# Patient Record
Sex: FEMALE | Race: WHITE | NOT HISPANIC OR LATINO | Employment: UNEMPLOYED | URBAN - NONMETROPOLITAN AREA
[De-identification: names, ages, dates, MRNs, and addresses within clinical notes are randomized per-mention and may not be internally consistent; named-entity substitution may affect disease eponyms.]

---

## 2017-04-22 DIAGNOSIS — R00.2 PALPITATIONS: Primary | ICD-10-CM

## 2017-04-22 DIAGNOSIS — I10 ESSENTIAL HYPERTENSION: ICD-10-CM

## 2017-04-24 RX ORDER — ATENOLOL 25 MG/1
TABLET ORAL
Qty: 60 TABLET | Refills: 5 | Status: SHIPPED | OUTPATIENT
Start: 2017-04-24 | End: 2017-11-05 | Stop reason: SDUPTHER

## 2017-11-05 DIAGNOSIS — R00.2 PALPITATIONS: ICD-10-CM

## 2017-11-05 DIAGNOSIS — I10 ESSENTIAL HYPERTENSION: ICD-10-CM

## 2017-11-06 RX ORDER — ATENOLOL 25 MG/1
TABLET ORAL
Qty: 60 TABLET | Refills: 4 | Status: SHIPPED | OUTPATIENT
Start: 2017-11-06 | End: 2018-12-05 | Stop reason: SDUPTHER

## 2018-03-06 ENCOUNTER — OFFICE VISIT (OUTPATIENT)
Dept: CARDIOLOGY | Facility: CLINIC | Age: 33
End: 2018-03-06

## 2018-03-06 VITALS
WEIGHT: 162.8 LBS | BODY MASS INDEX: 26.16 KG/M2 | HEART RATE: 74 BPM | DIASTOLIC BLOOD PRESSURE: 63 MMHG | SYSTOLIC BLOOD PRESSURE: 103 MMHG | OXYGEN SATURATION: 98 % | HEIGHT: 66 IN

## 2018-03-06 DIAGNOSIS — R00.2 PALPITATION: ICD-10-CM

## 2018-03-06 DIAGNOSIS — R55 SYNCOPE, UNSPECIFIED SYNCOPE TYPE: ICD-10-CM

## 2018-03-06 DIAGNOSIS — M79.603 PAIN OF UPPER EXTREMITY, UNSPECIFIED LATERALITY: ICD-10-CM

## 2018-03-06 DIAGNOSIS — R06.02 SHORTNESS OF BREATH: Primary | ICD-10-CM

## 2018-03-06 PROCEDURE — 93000 ELECTROCARDIOGRAM COMPLETE: CPT | Performed by: PHYSICIAN ASSISTANT

## 2018-03-06 PROCEDURE — 99214 OFFICE O/P EST MOD 30 MIN: CPT | Performed by: PHYSICIAN ASSISTANT

## 2018-03-06 RX ORDER — GABAPENTIN 400 MG/1
CAPSULE ORAL 3 TIMES DAILY
COMMUNITY
Start: 2018-03-05

## 2018-03-06 RX ORDER — LANSOPRAZOLE 30 MG/1
CAPSULE, DELAYED RELEASE ORAL DAILY
Refills: 5 | COMMUNITY
Start: 2018-02-27 | End: 2018-12-05

## 2018-03-06 RX ORDER — DICYCLOMINE HYDROCHLORIDE 10 MG/1
CAPSULE ORAL
Refills: 2 | COMMUNITY
Start: 2018-02-08

## 2018-03-06 RX ORDER — ATORVASTATIN CALCIUM 20 MG/1
20 TABLET, FILM COATED ORAL NIGHTLY
COMMUNITY
Start: 2018-03-05 | End: 2021-02-04 | Stop reason: SDUPTHER

## 2018-03-06 NOTE — PROGRESS NOTES
Problem list     Subjective   Rafat Barrera is a 32 y.o. female     Chief Complaint   Patient presents with   • Palpitations     Here for yearly f/u   • Heartburn       HPI      1. Chest pain  1.1 Stress test negative 2015 but EKG changes worrisome but no diagnostic for ischemia  1.2 No CAD noted by recent cardiac CT, April 2015.  2. Palpitations  3. Dyspnea  4. Tobacco Habituation, smokes 1/2 a pack a day  5. GERD  6.  Nonobstructive lower extremity arterial 2017    Patient is a 32-year-old female that presents back for follow-up.  She describes having intermittent episodes of arm discomfort in the left arm.  She describes it being exacerbated whenever she gets her blood pressure taken.  She has no chest pain or pressure.  She has very minor shortness of breath when exerting but nothing progressive.  No PND orthopnea.  She doesn't palpitate but on occasion with atenolol.  She does describe orthostatic lightheadedness.  She also has history of syncope.  She describes at random times she will be walking and feel a sudden loss of consciousness without any preceding cardiac or neurologic symptoms.  She has a chronic history of syncope per report but otherwise is doing well      Outpatient Encounter Prescriptions as of 3/6/2018   Medication Sig Dispense Refill   • aspirin 81 MG tablet Take 1 tablet by mouth daily.     • atenolol (TENORMIN) 25 MG tablet TAKE ONE TABLET BY MOUTH TWICE A DAY 60 tablet 4   • atorvastatin (LIPITOR) 10 MG tablet Every Night.     • dicyclomine (BENTYL) 10 MG capsule prn  2   • gabapentin (NEURONTIN) 400 MG capsule 3 (Three) Times a Day.     • lansoprazole (PREVACID) 30 MG capsule Daily.  5   • Loratadine (CLARITIN) 10 MG capsule Take  by mouth daily.     • nitroglycerin (NITROSTAT) 0.4 MG SL tablet Place  under the tongue.     • [DISCONTINUED] atorvastatin (LIPITOR) 40 MG tablet Take 1 tablet by mouth every night.     • [DISCONTINUED] omeprazole (PriLOSEC) 20 MG capsule Take 1 capsule by  mouth daily.       No facility-administered encounter medications on file as of 3/6/2018.        Latex    Past Medical History:   Diagnosis Date   • Abdominal pain    • Chest pain    • Fatigue    • Gastroenteritis    • GERD (gastroesophageal reflux disease)    • Hyperlipidemia    • Hypertension    • Palpitations    • Shortness of breath        Social History     Social History   • Marital status: Single     Spouse name: N/A   • Number of children: N/A   • Years of education: N/A     Occupational History   • Not on file.     Social History Main Topics   • Smoking status: Current Every Day Smoker     Packs/day: 1.00     Types: Cigarettes   • Smokeless tobacco: Never Used      Comment: 1/2 ppd   • Alcohol use No   • Drug use: No      Comment: History of drug abuse   • Sexual activity: Not on file     Other Topics Concern   • Not on file     Social History Narrative       Family History   Problem Relation Age of Onset   • Diabetes Mother    • Hyperlipidemia Mother    • Heart attack Father    • Hypertension Father    • Heart attack Maternal Aunt    • Heart attack Maternal Uncle    • Heart failure Other      congestive   • Stroke Other    • Sudden death Other        Review of Systems   Constitutional: Positive for fatigue (off and on).   HENT: Negative.    Eyes: Negative.    Respiratory: Positive for shortness of breath.    Cardiovascular: Positive for chest pain (occas.), palpitations and leg swelling.   Gastrointestinal: Positive for diarrhea.   Endocrine: Negative.    Genitourinary: Negative.    Musculoskeletal: Negative.    Skin: Negative.    Allergic/Immunologic: Positive for environmental allergies.   Neurological: Positive for dizziness and light-headedness.   Hematological: Bruises/bleeds easily (bleed).   Psychiatric/Behavioral: Positive for sleep disturbance (off and on).       Objective   Vitals:    03/06/18 1128   BP: 103/63   BP Location: Left arm   Patient Position: Sitting   Pulse: 74   SpO2: 98%   Weight:  "73.8 kg (162 lb 12.8 oz)   Height: 167.6 cm (65.98\")      /63 (BP Location: Left arm, Patient Position: Sitting)  Pulse 74  Ht 167.6 cm (65.98\")  Wt 73.8 kg (162 lb 12.8 oz)  SpO2 98%  BMI 26.29 kg/m2    Lab Results (most recent)     None          Physical Exam   Constitutional: She is oriented to person, place, and time. She appears well-developed and well-nourished. No distress.   HENT:   Head: Normocephalic and atraumatic.   Eyes: EOM are normal. Pupils are equal, round, and reactive to light.   Neck: No JVD present.   Cardiovascular: Normal rate, regular rhythm, normal heart sounds and intact distal pulses.  Exam reveals no gallop and no friction rub.    No murmur heard.  Pulmonary/Chest: Effort normal and breath sounds normal. No respiratory distress. She has no wheezes. She has no rales. She exhibits no tenderness.   Musculoskeletal: Normal range of motion. She exhibits no edema.   Neurological: She is alert and oriented to person, place, and time. No cranial nerve deficit.   Skin: Skin is warm and dry. No rash noted. No erythema. No pallor.   Psychiatric: She has a normal mood and affect. Her behavior is normal.   Nursing note and vitals reviewed.      Procedure     ECG 12 Lead  Date/Time: 3/6/2018 11:32 AM  Performed by: AUGUSTINA GOODWIN  Authorized by: AUGUSTINA GOODWIN   Rhythm: sinus tachycardia  Comments: EKG demonstrates sinus rhythm at 67 bpm otherwise normal               Assessment/Plan     Problems Addressed this Visit        Cardiovascular and Mediastinum    Palpitation    Relevant Orders    ECG 12 Lead    Ambulatory Referral to Neurology    Cardiac Event Monitor    Tilt Table    Syncope    Relevant Orders    Cardiac Event Monitor    Tilt Table       Respiratory    Shortness of breath - Primary    Relevant Orders    ECG 12 Lead    Ambulatory Referral to Neurology    Cardiac Event Monitor    Tilt Table       Nervous and Auditory    Pain of upper extremity    Relevant Orders    Ambulatory " Referral to Neurology    Cardiac Event Monitor    Tilt Table                     Recommendation  1.  I would like patient referred to neurology based on her upper extremity pain.  On physical examination she had reproduction of pain whenever I assessed her radial artery.  I concerned about carpal tunnel syndrome and we'll refer her to neurology for nerve conduction study.  2.  I would like to obtain an extended event monitor look for any arrhythmias based on patient's syncope.  She has history of normal coronaries by cardiac CTA  3.  I would like to obtain a tilt table test ordered and look for any evidence of neurocardiogenic syncope which is high and differential diagnosis.  4.  Otherwise we'll see her back for follow-up of above testing.  She is not to do any activity in which a sudden loss of consciousness could cause bodily harm such as driving.  She'll follow-up primary as scheduled     Electronically signed by:

## 2018-03-19 ENCOUNTER — HOSPITAL ENCOUNTER (OUTPATIENT)
Dept: CARDIOLOGY | Facility: HOSPITAL | Age: 33
Discharge: HOME | End: 2018-03-19
Admitting: PHYSICIAN ASSISTANT

## 2018-03-19 LAB — BH CV TILT AGENT USED: NORMAL

## 2018-03-19 PROCEDURE — 93660 TILT TABLE EVALUATION: CPT

## 2018-11-07 DIAGNOSIS — R00.2 PALPITATIONS: ICD-10-CM

## 2018-11-07 DIAGNOSIS — I10 ESSENTIAL HYPERTENSION: ICD-10-CM

## 2018-11-07 RX ORDER — ATENOLOL 25 MG/1
TABLET ORAL
Qty: 60 TABLET | Refills: 3 | OUTPATIENT
Start: 2018-11-07

## 2018-12-05 ENCOUNTER — OFFICE VISIT (OUTPATIENT)
Dept: CARDIOLOGY | Facility: CLINIC | Age: 33
End: 2018-12-05

## 2018-12-05 VITALS
SYSTOLIC BLOOD PRESSURE: 109 MMHG | WEIGHT: 177.6 LBS | DIASTOLIC BLOOD PRESSURE: 61 MMHG | OXYGEN SATURATION: 98 % | BODY MASS INDEX: 28.54 KG/M2 | HEART RATE: 80 BPM | HEIGHT: 66 IN

## 2018-12-05 DIAGNOSIS — I10 ESSENTIAL HYPERTENSION: ICD-10-CM

## 2018-12-05 DIAGNOSIS — R06.02 SHORTNESS OF BREATH: ICD-10-CM

## 2018-12-05 DIAGNOSIS — R00.2 PALPITATIONS: ICD-10-CM

## 2018-12-05 DIAGNOSIS — R07.9 CHEST PAIN, UNSPECIFIED TYPE: Primary | ICD-10-CM

## 2018-12-05 PROCEDURE — 99213 OFFICE O/P EST LOW 20 MIN: CPT | Performed by: PHYSICIAN ASSISTANT

## 2018-12-05 RX ORDER — ATENOLOL 25 MG/1
25 TABLET ORAL 2 TIMES DAILY
Qty: 60 TABLET | Refills: 6 | Status: SHIPPED | OUTPATIENT
Start: 2018-12-05 | End: 2020-03-02 | Stop reason: SDUPTHER

## 2018-12-05 RX ORDER — OMEPRAZOLE 20 MG/1
CAPSULE, DELAYED RELEASE ORAL
Refills: 3 | COMMUNITY
Start: 2018-12-03 | End: 2021-02-04 | Stop reason: SDUPTHER

## 2018-12-05 NOTE — PATIENT INSTRUCTIONS
Obesity, Adult  Obesity is the condition of having too much total body fat. Being overweight or obese means that your weight is greater than what is considered healthy for your body size. Obesity is determined by a measurement called BMI. BMI is an estimate of body fat and is calculated from height and weight. For adults, a BMI of 30 or higher is considered obese.  Obesity can eventually lead to other health concerns and major illnesses, including:  · Stroke.  · Coronary artery disease (CAD).  · Type 2 diabetes.  · Some types of cancer, including cancers of the colon, breast, uterus, and gallbladder.  · Osteoarthritis.  · High blood pressure (hypertension).  · High cholesterol.  · Sleep apnea.  · Gallbladder stones.  · Infertility problems.    What are the causes?  The main cause of obesity is taking in (consuming) more calories than your body uses for energy. Other factors that contribute to this condition may include:  · Being born with genes that make you more likely to become obese.  · Having a medical condition that causes obesity. These conditions include:  ? Hypothyroidism.  ? Polycystic ovarian syndrome (PCOS).  ? Binge-eating disorder.  ? Cushing syndrome.  · Taking certain medicines, such as steroids, antidepressants, and seizure medicines.  · Not being physically active (sedentary lifestyle).  · Living where there are limited places to exercise safely or buy healthy foods.  · Not getting enough sleep.    What increases the risk?  The following factors may increase your risk of this condition:  · Having a family history of obesity.  · Being a woman of -American descent.  · Being a man of  descent.    What are the signs or symptoms?  Having excessive body fat is the main symptom of this condition.  How is this diagnosed?  This condition may be diagnosed based on:  · Your symptoms.  · Your medical history.  · A physical exam. Your health care provider may measure:  ? Your BMI. If you are an  adult with a BMI between 25 and less than 30, you are considered overweight. If you are an adult with a BMI of 30 or higher, you are considered obese.  ? The distances around your hips and your waist (circumferences). These may be compared to each other to help diagnose your condition.  ? Your skinfold thickness. Your health care provider may gently pinch a fold of your skin and measure it.    How is this treated?  Treatment for this condition often includes changing your lifestyle. Treatment may include some or all of the following:  · Dietary changes. Work with your health care provider and a dietitian to set a weight-loss goal that is healthy and reasonable for you. Dietary changes may include eating:  ? Smaller portions. A portion size is the amount of a particular food that is healthy for you to eat at one time. This varies from person to person.  ? Low-calorie or low-fat options.  ? More whole grains, fruits, and vegetables.  · Regular physical activity. This may include aerobic activity (cardio) and strength training.  · Medicine to help you lose weight. Your health care provider may prescribe medicine if you are unable to lose 1 pound a week after 6 weeks of eating more healthily and doing more physical activity.  · Surgery. Surgical options may include gastric banding and gastric bypass. Surgery may be done if:  ? Other treatments have not helped to improve your condition.  ? You have a BMI of 40 or higher.  ? You have life-threatening health problems related to obesity.    Follow these instructions at home:    Eating and drinking    · Follow recommendations from your health care provider about what you eat and drink. Your health care provider may advise you to:  ? Limit fast foods, sweets, and processed snack foods.  ? Choose low-fat options, such as low-fat milk instead of whole milk.  ? Eat 5 or more servings of fruits or vegetables every day.  ? Eat at home more often. This gives you more control over  what you eat.  ? Choose healthy foods when you eat out.  ? Learn what a healthy portion size is.  ? Keep low-fat snacks on hand.  ? Avoid sugary drinks, such as soda, fruit juice, iced tea sweetened with sugar, and flavored milk.  ? Eat a healthy breakfast.  · Drink enough water to keep your urine clear or pale yellow.  · Do not go without eating for long periods of time (do not fast) or follow a fad diet. Fasting and fad diets can be unhealthy and even dangerous.  Physical Activity  · Exercise regularly, as told by your health care provider. Ask your health care provider what types of exercise are safe for you and how often you should exercise.  · Warm up and stretch before being active.  · Cool down and stretch after being active.  · Rest between periods of activity.  Lifestyle  · Limit the time that you spend in front of your TV, computer, or video game system.  · Find ways to reward yourself that do not involve food.  · Limit alcohol intake to no more than 1 drink a day for nonpregnant women and 2 drinks a day for men. One drink equals 12 oz of beer, 5 oz of wine, or 1½ oz of hard liquor.  General instructions  · Keep a weight loss journal to keep track of the food you eat and how much you exercise you get.  · Take over-the-counter and prescription medicines only as told by your health care provider.  · Take vitamins and supplements only as told by your health care provider.  · Consider joining a support group. Your health care provider may be able to recommend a support group.  · Keep all follow-up visits as told by your health care provider. This is important.  Contact a health care provider if:  · You are unable to meet your weight loss goal after 6 weeks of dietary and lifestyle changes.  This information is not intended to replace advice given to you by your health care provider. Make sure you discuss any questions you have with your health care provider.  Document Released: 01/25/2006 Document Revised:  05/22/2017 Document Reviewed: 10/05/2016  [employer] Interactive Patient Education © 2018 [employer]  MyPlate from [employer]  The general, healthful diet is based on the 2010 Dietary Guidelines for Americans. The amount of food you need to eat from each food group depends on your age, sex, and level of physical activity and can be individualized by a dietitian. Go to ChooseMyPlate.gov for more information.  What do I need to know about the MyPlate plan?  · Enjoy your food, but eat less.  · Avoid oversized portions.  ? ½ of your plate should include fruits and vegetables.  ? ¼ of your plate should be grains.  ? ¼ of your plate should be protein.  Grains  · Make at least half of your grains whole grains.  · For a 2,000 calorie daily food plan, eat 6 oz every day.  · 1 oz is about 1 slice bread, 1 cup cereal, or ½ cup cooked rice, cereal, or pasta.  Vegetables  · Make half your plate fruits and vegetables.  · For a 2,000 calorie daily food plan, eat 2½ cups every day.  · 1 cup is about 1 cup raw or cooked vegetables or vegetable juice or 2 cups raw leafy greens.  Fruits  · Make half your plate fruits and vegetables.  · For a 2,000 calorie daily food plan, eat 2 cups every day.  · 1 cup is about 1 cup fruit or 100% fruit juice or ½ cup dried fruit.  Protein  · For a 2,000 calorie daily food plan, eat 5½ oz every day.  · 1 oz is about 1 oz meat, poultry, or fish, ¼ cup cooked beans, 1 egg, 1 Tbsp peanut butter, or ½ oz nuts or seeds.  Dairy  · Switch to fat-free or low-fat (1%) milk.  · For a 2,000 calorie daily food plan, eat 3 cups every day.  · 1 cup is about 1 cup milk or yogurt or soy milk (soy beverage), 1½ oz natural cheese, or 2 oz processed cheese.  Fats, Oils, and Empty Calories  · Only small amounts of oils are recommended.  · Empty calories are calories from solid fats or added sugars.  · Compare sodium in foods like soup, bread, and frozen meals. Choose the foods with lower numbers.  · Drink water instead of  sugary drinks.  What foods can I eat?  Grains  Whole grains such as whole wheat, quinoa, millet, and bulgur. Bread, rolls, and pasta made from whole grains. Brown or wild rice. Hot or cold cereals made from whole grains and without added sugar.  Vegetables  All fresh vegetables, especially fresh red, dark green, or orange vegetables. Peas and beans. Low-sodium frozen or canned vegetables prepared without added salt. Low-sodium vegetable juices.  Fruits  All fresh, frozen, and dried fruits. Canned fruit packed in water or fruit juice without added sugar. Fruit juices without added sugar.  Meats and Other Protein Sources  Boiled, baked, or grilled lean meat trimmed of fat. Skinless poultry. Fresh seafood and shellfish. Canned seafood packed in water. Unsalted nuts and unsalted nut butters. Tofu. Dried beans and pea. Eggs.  Dairy  Low-fat or fat-free milk, yogurt, and cheeses.  Sweets and Desserts  Frozen desserts made from low-fat milk.  Fats and Oils  Olive, peanut, and canola oils and margarine. Salad dressing and mayonnaise made from these oils.  Other  Soups and casseroles made from allowed ingredients and without added fat or salt.  The items listed above may not be a complete list of recommended foods or beverages. Contact your dietitian for more options.  What foods are not recommended?  Grains  Sweetened, low-fiber cereals. Packaged baked goods. Snack crackers and chips. Cheese crackers, butter crackers, and biscuits. Frozen waffles, sweet breads, doughnuts, pastries, packaged baking mixes, pancakes, cakes, and cookies.  Vegetables  Regular canned or frozen vegetables or vegetables prepared with salt. Canned tomatoes. Canned tomato sauce. Fried vegetables. Vegetables in cream sauce or cheese sauce.  Fruits  Fruits packed in syrup or made with added sugar.  Meats and Other Protein Sources  Marbled or fatty meats such as ribs. Poultry with skin. Fried meats, poultry, eggs, or fish. Sausages, hot dogs, and deli  meats such as pastrami, bologna, or salami.  Dairy  Whole milk, cream, cheeses made from whole milk, sour cream. Ice cream or yogurt made from whole milk or with added sugar.  Beverages  For adults, no more than one alcoholic drink per day. Regular soft drinks or other sugary beverages. Juice drinks.  Sweets and Desserts  Sugary or fatty desserts, candy, and other sweets.  Fats and Oils  Solid shortening or partially hydrogenated oils. Solid margarine. Margarine that contains trans fats. Butter.  The items listed above may not be a complete list of foods and beverages to avoid. Contact your dietitian for more information.  This information is not intended to replace advice given to you by your health care provider. Make sure you discuss any questions you have with your health care provider.  Document Released: 01/06/2009 Document Revised: 05/25/2017 Document Reviewed: 11/26/2014  [employer] Interactive Patient Education © 2018 [employer]  For more information:    Quit Now [place]  1-800-QUIT-NOW  https://[place].quitlogix.org/en-[place]/  Steps to Quit Smoking  Smoking tobacco can be harmful to your health and can affect almost every organ in your body. Smoking puts you, and those around you, at risk for developing many serious chronic diseases. Quitting smoking is difficult, but it is one of the best things that you can do for your health. It is never too late to quit.  What are the benefits of quitting smoking?  When you quit smoking, you lower your risk of developing serious diseases and conditions, such as:  · Lung cancer or lung disease, such as COPD.  · Heart disease.  · Stroke.  · Heart attack.  · Infertility.  · Osteoporosis and bone fractures.  Additionally, symptoms such as coughing, wheezing, and shortness of breath may get better when you quit. You may also find that you get sick less often because your body is stronger at fighting off colds and infections. If you are pregnant, quitting smoking can help to  reduce your chances of having a baby of low birth weight.  How do I get ready to quit?  When you decide to quit smoking, create a plan to make sure that you are successful. Before you quit:  · Pick a date to quit. Set a date within the next two weeks to give you time to prepare.  · Write down the reasons why you are quitting. Keep this list in places where you will see it often, such as on your bathroom mirror or in your car or wallet.  · Identify the people, places, things, and activities that make you want to smoke (triggers) and avoid them. Make sure to take these actions:  ¨ Throw away all cigarettes at home, at work, and in your car.  ¨ Throw away smoking accessories, such as ashtrays and lighters.  ¨ Clean your car and make sure to empty the ashtray.  ¨ Clean your home, including curtains and carpets.  · Tell your family, friends, and coworkers that you are quitting. Support from your loved ones can make quitting easier.  · Talk with your health care provider about your options for quitting smoking.  · Find out what treatment options are covered by your health insurance.  What strategies can I use to quit smoking?  Talk with your healthcare provider about different strategies to quit smoking. Some strategies include:  · Quitting smoking altogether instead of gradually lessening how much you smoke over a period of time. Research shows that quitting “cold turkey” is more successful than gradually quitting.  · Attending in-person counseling to help you build problem-solving skills. You are more likely to have success in quitting if you attend several counseling sessions. Even short sessions of 10 minutes can be effective.  · Finding resources and support systems that can help you to quit smoking and remain smoke-free after you quit. These resources are most helpful when you use them often. They can include:  ¨ Online chats with a counselor.  ¨ Telephone quitlines.  ¨ Printed self-help materials.  ¨ Support groups  or group counseling.  ¨ Text messaging programs.  ¨ Mobile phone applications.  · Taking medicines to help you quit smoking. (If you are pregnant or breastfeeding, talk with your health care provider first.) Some medicines contain nicotine and some do not. Both types of medicines help with cravings, but the medicines that include nicotine help to relieve withdrawal symptoms. Your health care provider may recommend:  ¨ Nicotine patches, gum, or lozenges.  ¨ Nicotine inhalers or sprays.  ¨ Non-nicotine medicine that is taken by mouth.  Talk with your health care provider about combining strategies, such as taking medicines while you are also receiving in-person counseling. Using these two strategies together makes you more likely to succeed in quitting than if you used either strategy on its own.  If you are pregnant or breastfeeding, talk with your health care provider about finding counseling or other support strategies to quit smoking. Do not take medicine to help you quit smoking unless told to do so by your health care provider.  What things can I do to make it easier to quit?  Quitting smoking might feel overwhelming at first, but there is a lot that you can do to make it easier. Take these important actions:  · Reach out to your family and friends and ask that they support and encourage you during this time. Call telephone quitlines, reach out to support groups, or work with a counselor for support.  · Ask people who smoke to avoid smoking around you.  · Avoid places that trigger you to smoke, such as bars, parties, or smoke-break areas at work.  · Spend time around people who do not smoke.  · Lessen stress in your life, because stress can be a smoking trigger for some people. To lessen stress, try:  ¨ Exercising regularly.  ¨ Deep-breathing exercises.  ¨ Yoga.  ¨ Meditating.  ¨ Performing a body scan. This involves closing your eyes, scanning your body from head to toe, and noticing which parts of your body  are particularly tense. Purposefully relax the muscles in those areas.  · Download or purchase mobile phone or tablet apps (applications) that can help you stick to your quit plan by providing reminders, tips, and encouragement. There are many free apps, such as QuitGuide from the CDC (Centers for Disease Control and Prevention). You can find other support for quitting smoking (smoking cessation) through smokefree.gov and other websites.  How will I feel when I quit smoking?  Within the first 24 hours of quitting smoking, you may start to feel some withdrawal symptoms. These symptoms are usually most noticeable 2-3 days after quitting, but they usually do not last beyond 2-3 weeks. Changes or symptoms that you might experience include:  · Mood swings.  · Restlessness, anxiety, or irritation.  · Difficulty concentrating.  · Dizziness.  · Strong cravings for sugary foods in addition to nicotine.  · Mild weight gain.  · Constipation.  · Nausea.  · Coughing or a sore throat.  · Changes in how your medicines work in your body.  · A depressed mood.  · Difficulty sleeping (insomnia).  After the first 2-3 weeks of quitting, you may start to notice more positive results, such as:  · Improved sense of smell and taste.  · Decreased coughing and sore throat.  · Slower heart rate.  · Lower blood pressure.  · Clearer skin.  · The ability to breathe more easily.  · Fewer sick days.  Quitting smoking is very challenging for most people. Do not get discouraged if you are not successful the first time. Some people need to make many attempts to quit before they achieve long-term success. Do your best to stick to your quit plan, and talk with your health care provider if you have any questions or concerns.  This information is not intended to replace advice given to you by your health care provider. Make sure you discuss any questions you have with your health care provider.  Document Released: 12/12/2002 Document Revised: 08/15/2017  Document Reviewed: 05/03/2016  [employer] Interactive Patient Education © 2017 [employer]

## 2018-12-05 NOTE — PROGRESS NOTES
Problem list     Subjective   Rafat Barrera is a 33 y.o. female     Chief Complaint   Patient presents with   • Palpitations     presents as a follow up   • Shortness of Breath   • Chest Pain       HPI      1. Chest pain  1.1 Stress test negative 2015 but EKG changes worrisome but no diagnostic for ischemia  1.2 No CAD noted by recent cardiac CT, April 2015.  2. Palpitations  3. Dyspnea  4. Tobacco Habituation, smokes 1/2 a pack a day  5. GERD  6.  Nonobstructive lower extremity arterial 2017  7.  Negative tilt table test March 2018    Patient is a 33-year-old female that presents back to the office for follow-up.  Patient has been doing well.  She has been on atenolol to help with an appropriate sinus tachycardia.  She had negative tilt table testing because of symptoms which was negative.    She is feeling well.  On very rare occasion will she get substernal discomfort that usually last for seconds and subsides.  It has not changed, worsened or progressed anyway.  She has mild levels of exertional dyspnea which is chronic without any progression as well.    She has no failure symptoms.  Palpitations are improved on atenolol.  She has occasional orthostatic lightheadedness but otherwise is doing well            Outpatient Encounter Medications as of 12/5/2018   Medication Sig Dispense Refill   • aspirin 81 MG tablet Take 1 tablet by mouth daily.     • atenolol (TENORMIN) 25 MG tablet Take 1 tablet by mouth 2 (Two) Times a Day. 60 tablet 6   • atorvastatin (LIPITOR) 10 MG tablet Take 10 mg by mouth Every Night.     • dicyclomine (BENTYL) 10 MG capsule prn  2   • gabapentin (NEURONTIN) 400 MG capsule 3 (Three) Times a Day.     • Loratadine (CLARITIN) 10 MG capsule Take  by mouth daily.     • nitroglycerin (NITROSTAT) 0.4 MG SL tablet Place  under the tongue.     • omeprazole (priLOSEC) 20 MG capsule   3   • [DISCONTINUED] atenolol (TENORMIN) 25 MG tablet TAKE ONE TABLET BY MOUTH TWICE A DAY 60 tablet 4   •  [DISCONTINUED] lansoprazole (PREVACID) 30 MG capsule Daily.  5     No facility-administered encounter medications on file as of 12/5/2018.        Latex    Past Medical History:   Diagnosis Date   • Abdominal pain    • Chest pain    • Fatigue    • Gastroenteritis    • GERD (gastroesophageal reflux disease)    • Hyperlipidemia    • Hypertension    • Palpitations    • Shortness of breath        Social History     Socioeconomic History   • Marital status: Single     Spouse name: Not on file   • Number of children: Not on file   • Years of education: Not on file   • Highest education level: Not on file   Social Needs   • Financial resource strain: Not on file   • Food insecurity - worry: Not on file   • Food insecurity - inability: Not on file   • Transportation needs - medical: Not on file   • Transportation needs - non-medical: Not on file   Occupational History   • Not on file   Tobacco Use   • Smoking status: Current Every Day Smoker     Packs/day: 1.00     Types: Cigarettes   • Smokeless tobacco: Never Used   • Tobacco comment: 1/2 ppd   Substance and Sexual Activity   • Alcohol use: No   • Drug use: No     Comment: History of drug abuse   • Sexual activity: Not on file   Other Topics Concern   • Not on file   Social History Narrative   • Not on file       Family History   Problem Relation Age of Onset   • Diabetes Mother    • Hyperlipidemia Mother    • Heart attack Father    • Hypertension Father    • Heart attack Maternal Aunt    • Heart attack Maternal Uncle    • Heart failure Other         congestive   • Stroke Other    • Sudden death Other        Review of Systems   Constitutional: Positive for fatigue.   HENT: Negative.    Eyes: Positive for visual disturbance.   Respiratory: Positive for shortness of breath.    Cardiovascular: Positive for chest pain and palpitations. Negative for leg swelling.   Gastrointestinal: Positive for diarrhea. Negative for constipation.   Endocrine: Negative.    Genitourinary:  "Positive for difficulty urinating, frequency and urgency.   Musculoskeletal: Positive for arthralgias and myalgias.   Skin: Negative.    Allergic/Immunologic: Negative for environmental allergies and food allergies.   Neurological: Positive for dizziness and light-headedness.   Hematological: Bruises/bleeds easily ( does not heal fast ).   Psychiatric/Behavioral: The patient is nervous/anxious.    All other systems reviewed and are negative.      Objective   Vitals:    12/05/18 1541   BP: 109/61   BP Location: Left arm   Patient Position: Sitting   Pulse: 80   SpO2: 98%   Weight: 80.6 kg (177 lb 9.6 oz)   Height: 167.6 cm (65.98\")      /61 (BP Location: Left arm, Patient Position: Sitting)   Pulse 80   Ht 167.6 cm (65.98\")   Wt 80.6 kg (177 lb 9.6 oz)   SpO2 98%   BMI 28.68 kg/m²     Lab Results (most recent)     None          Physical Exam   Constitutional: She is oriented to person, place, and time. She appears well-developed and well-nourished. No distress.   HENT:   Head: Normocephalic and atraumatic.   Eyes: EOM are normal. Pupils are equal, round, and reactive to light.   Neck: No JVD present.   Cardiovascular: Normal rate, regular rhythm, normal heart sounds and intact distal pulses. Exam reveals no gallop and no friction rub.   No murmur heard.  Pulmonary/Chest: Effort normal and breath sounds normal. No respiratory distress. She has no wheezes. She has no rales. She exhibits no tenderness.   Musculoskeletal: Normal range of motion. She exhibits no edema.   Neurological: She is alert and oriented to person, place, and time. No cranial nerve deficit.   Skin: Skin is warm and dry. No rash noted. No erythema. No pallor.   Psychiatric: She has a normal mood and affect. Her behavior is normal.   Nursing note and vitals reviewed.      Procedure   Procedures       Assessment/Plan     Problems Addressed this Visit        Cardiovascular and Mediastinum    Palpitations    Relevant Medications    atenolol " (TENORMIN) 25 MG tablet       Respiratory    Shortness of breath       Nervous and Auditory    Chest pain - Primary      Other Visit Diagnoses     Essential hypertension        Relevant Medications    atenolol (TENORMIN) 25 MG tablet           recommendation  1.  Patient doing well.  Symptoms are atypical at this time.  I do not feel cardiac source is related to her chest discomfort but likely musculoskeletal.  2.  We will continue atenolol.  We will see her back for follow-up in 6 months or sooner symptoms discussed.  She'll follow-up primary as scheduled           I advised Rafat of the risks of continuing to use tobacco, and I provided her with tobacco cessation educational materials in the After Visit Summary.     During this visit, I spent <3 minutes counseling the patient regarding tobacco cessation.     Patient's Body mass index is 28.68 kg/m². BMI is above normal parameters. Recommendations include: educational material.       Electronically signed by:

## 2019-02-18 ENCOUNTER — TELEPHONE (OUTPATIENT)
Dept: CARDIOLOGY | Facility: CLINIC | Age: 34
End: 2019-02-18

## 2019-02-18 NOTE — TELEPHONE ENCOUNTER
Patient request sooner visit as she is having active chest pain and shortness of breath. Advised patient with having active chest pain she needs to proceed to the ER for evaluation and treatment. Patient states she will go to the ER. Follow up scheduled in office for 2/21/19. Maureen Cabrales MA

## 2019-02-21 ENCOUNTER — OFFICE VISIT (OUTPATIENT)
Dept: CARDIOLOGY | Facility: CLINIC | Age: 34
End: 2019-02-21

## 2019-02-21 VITALS
HEIGHT: 65 IN | BODY MASS INDEX: 28.16 KG/M2 | WEIGHT: 169 LBS | SYSTOLIC BLOOD PRESSURE: 111 MMHG | DIASTOLIC BLOOD PRESSURE: 48 MMHG | OXYGEN SATURATION: 97 % | HEART RATE: 83 BPM

## 2019-02-21 DIAGNOSIS — R06.02 SHORTNESS OF BREATH: Primary | ICD-10-CM

## 2019-02-21 DIAGNOSIS — R07.9 CHEST PAIN, UNSPECIFIED TYPE: ICD-10-CM

## 2019-02-21 DIAGNOSIS — R00.2 PALPITATIONS: ICD-10-CM

## 2019-02-21 PROCEDURE — 99213 OFFICE O/P EST LOW 20 MIN: CPT | Performed by: PHYSICIAN ASSISTANT

## 2019-02-21 NOTE — PROGRESS NOTES
Problem list     Subjective   Rafat Barrera is a 33 y.o. female     Chief Complaint   Patient presents with   • Chest Pain   • Shortness of Breath   • Palpitations       HPI         1. Chest pain  1.1 Stress test negative 2015 but EKG changes worrisome but no diagnostic for ischemia  1.2 No CAD noted by recent cardiac CT, April 2015.  2. Palpitations  3. Dyspnea  4. Tobacco Habituation, smokes 1/2 a pack a day  5. GERD  6.  Nonobstructive lower extremity arterial 2017  7.  Negative tilt table test March 2018      Patient is a 33-year-old female that presents to the office for follow-up.  Patient recently was in the emergency room.  Patient had complaints of chest pain and dizziness and went to the emergency room with workup being unremarkable.  She was discharged bedtime.    She has noted at times that her blood pressure will be significantly low limits 1 she develops symptoms.  She will have chest tightness as well as significant fatigue and weakness.  However, she does describe that the atenolol has helped her tremendously in regards to palpitations.  She notices an occasional flutter but feels like the medication is working well.    She has not had any chest pain since hospital discharge.  She has very mild levels of dyspnea but excellent functional status.  No PND orthopnea.    She's had no presyncope or syncope.  She is feeling well otherwise      Outpatient Encounter Medications as of 2/21/2019   Medication Sig Dispense Refill   • aspirin 81 MG tablet Take 1 tablet by mouth daily.     • atenolol (TENORMIN) 25 MG tablet Take 1 tablet by mouth 2 (Two) Times a Day. 60 tablet 6   • atorvastatin (LIPITOR) 10 MG tablet Take 20 mg by mouth Every Night.     • dicyclomine (BENTYL) 10 MG capsule prn  2   • gabapentin (NEURONTIN) 400 MG capsule 3 (Three) Times a Day.     • Loratadine (CLARITIN) 10 MG capsule Take  by mouth daily.     • nitroglycerin (NITROSTAT) 0.4 MG SL tablet Place  under the tongue.     •  omeprazole (priLOSEC) 20 MG capsule   3     No facility-administered encounter medications on file as of 2/21/2019.        Latex and Sulfa antibiotics    Past Medical History:   Diagnosis Date   • Abdominal pain    • Chest pain    • Fatigue    • Gastroenteritis    • GERD (gastroesophageal reflux disease)    • Hyperlipidemia    • Hypertension    • Palpitations    • Shortness of breath        Social History     Socioeconomic History   • Marital status: Single     Spouse name: Not on file   • Number of children: Not on file   • Years of education: Not on file   • Highest education level: Not on file   Social Needs   • Financial resource strain: Not on file   • Food insecurity - worry: Not on file   • Food insecurity - inability: Not on file   • Transportation needs - medical: Not on file   • Transportation needs - non-medical: Not on file   Occupational History   • Not on file   Tobacco Use   • Smoking status: Current Every Day Smoker     Packs/day: 1.00     Types: Cigarettes   • Smokeless tobacco: Never Used   • Tobacco comment: 1/2 ppd   Substance and Sexual Activity   • Alcohol use: No   • Drug use: No     Comment: History of drug abuse   • Sexual activity: Not on file   Other Topics Concern   • Not on file   Social History Narrative   • Not on file       Family History   Problem Relation Age of Onset   • Diabetes Mother    • Hyperlipidemia Mother    • Heart attack Father    • Hypertension Father    • Heart attack Maternal Aunt    • Heart attack Maternal Uncle    • Heart failure Other         congestive   • Stroke Other    • Sudden death Other        Review of Systems   Constitutional: Positive for fatigue (Tired all the time ). Negative for chills (Since saturday ) and fever.   HENT: Negative.    Eyes: Negative.  Negative for visual disturbance.   Respiratory: Positive for shortness of breath (Since saturday ). Negative for chest tightness.    Cardiovascular: Positive for chest pain (Chest pain radiating down right  "arm on saturday night. Went to [facility] ER) and palpitations (Fluttering since saturday ). Negative for leg swelling.   Gastrointestinal: Positive for nausea (Since friday ). Abdominal pain: Epigastric discomfort    Endocrine: Positive for heat intolerance (Feels hot all the time ).   Genitourinary: Negative.    Musculoskeletal: Positive for arthralgias (joints ) and back pain (Upper back pain ).   Skin: Negative.    Allergic/Immunologic: Negative.    Neurological: Positive for light-headedness (Occasional ).   Hematological: Bruises/bleeds easily (Bleeds easily ).   Psychiatric/Behavioral: Negative for sleep disturbance (Denies waking with smothering or SOA).   All other systems reviewed and are negative.      Objective   Vitals:    02/21/19 1155   BP: 111/48   BP Location: Left arm   Patient Position: Sitting   Pulse: 83   SpO2: 97%   Weight: 76.7 kg (169 lb)   Height: 165.1 cm (65\")      /48 (BP Location: Left arm, Patient Position: Sitting)   Pulse 83   Ht 165.1 cm (65\")   Wt 76.7 kg (169 lb)   SpO2 97%   BMI 28.12 kg/m²     Lab Results (most recent)     None          Physical Exam   Constitutional: She is oriented to person, place, and time. She appears well-developed and well-nourished. No distress.   HENT:   Head: Normocephalic and atraumatic.   Eyes: EOM are normal. Pupils are equal, round, and reactive to light.   Neck: No JVD present.   Cardiovascular: Normal rate, regular rhythm, normal heart sounds and intact distal pulses. Exam reveals no gallop and no friction rub.   No murmur heard.  Pulmonary/Chest: Effort normal and breath sounds normal. No respiratory distress. She has no wheezes. She has no rales. She exhibits no tenderness.   Musculoskeletal: Normal range of motion. She exhibits no edema.   Neurological: She is alert and oriented to person, place, and time. No cranial nerve deficit.   Skin: Skin is warm and dry. No rash noted. No erythema. No pallor.   Psychiatric: She has a normal mood " and affect. Her behavior is normal.   Nursing note and vitals reviewed.      Procedure   Procedures       Assessment/Plan     Problems Addressed this Visit        Cardiovascular and Mediastinum    Palpitations       Respiratory    Shortness of breath - Primary       Nervous and Auditory    Chest pain            Recommendation  1.  I did discuss with the patient that I feel her hypotension could be contributed to her symptoms.  She feels that the medication is working well.  I discussed with could decrease the dose but she would like to continue current dosing.  I did discuss with her that when her blood pressure is low to hold her atenolol.  She would like to titrate the medication as discussed and monitor her symptoms.  Otherwise she is feeling remarkably well at this time.  I would like to see her back in 2-3 months to reevaluate clinical course.  She'll follow-up primary as scheduled         I advised Rafat of the risks of continuing to use tobacco, and I provided her with tobacco cessation educational materials in the After Visit Summary.     During this visit, I spent >3 minutes counseling the patient regarding tobacco cessation.     Patient's Body mass index is 28.12 kg/m². BMI is above normal parameters. Recommendations include: educational material and referral to primary care.       Electronically signed by:

## 2019-02-21 NOTE — PATIENT INSTRUCTIONS
"Fat and Cholesterol Restricted Diet  Getting too much fat and cholesterol in your diet may cause health problems. Following this diet helps keep your fat and cholesterol at normal levels. This can keep you from getting sick.  What types of fat should I choose?  · Choose monosaturated and polyunsaturated fats. These are found in foods such as olive oil, canola oil, flaxseeds, walnuts, almonds, and seeds.  · Eat more omega-3 fats. Good choices include salmon, mackerel, sardines, tuna, flaxseed oil, and ground flaxseeds.  · Limit saturated fats. These are in animal products such as meats, butter, and cream. They can also be in plant products such as palm oil, palm kernel oil, and coconut oil.  · Avoid foods with partially hydrogenated oils in them. These contain trans fats. Examples of foods that have trans fats are stick margarine, some tub margarines, cookies, crackers, and other baked goods.  What general guidelines do I need to follow?  · Check food labels. Look for the words \"trans fat\" and \"saturated fat.\"  · When preparing a meal:  ? Fill half of your plate with vegetables and green salads.  ? Fill one fourth of your plate with whole grains. Look for the word \"whole\" as the first word in the ingredient list.  ? Fill one fourth of your plate with lean protein foods.  · Eat more foods that have fiber, like apples, carrots, beans, peas, and barley.  · Eat more home-cooked foods. Eat less at restaurants and buffets.  · Limit or avoid alcohol.  · Limit foods high in starch and sugar.  · Limit fried foods.  · Cook foods without frying them. Baking, boiling, grilling, and broiling are all great options.  · Lose weight if you are overweight. Losing even a small amount of weight can help your overall health. It can also help prevent diseases such as diabetes and heart disease.  What foods can I eat?  Grains  Whole grains, such as whole wheat or whole grain breads, crackers, cereals, and pasta. Unsweetened oatmeal, " bulgur, barley, quinoa, or brown rice. Corn or whole wheat flour tortillas.  Vegetables  Fresh or frozen vegetables (raw, steamed, roasted, or grilled). Green salads.  Fruits  All fresh, canned (in natural juice), or frozen fruits.  Meat and Other Protein Products  Ground beef (85% or leaner), grass-fed beef, or beef trimmed of fat. Skinless chicken or turkey. Ground chicken or turkey. Pork trimmed of fat. All fish and seafood. Eggs. Dried beans, peas, or lentils. Unsalted nuts or seeds. Unsalted canned or dry beans.  Dairy  Low-fat dairy products, such as skim or 1% milk, 2% or reduced-fat cheeses, low-fat ricotta or cottage cheese, or plain low-fat yogurt.  Fats and Oils  Tub margarines without trans fats. Light or reduced-fat mayonnaise and salad dressings. Avocado. Olive, canola, sesame, or safflower oils. Natural peanut or almond butter (choose ones without added sugar and oil).  The items listed above may not be a complete list of recommended foods or beverages. Contact your dietitian for more options.  What foods are not recommended?  Grains  White bread. White pasta. White rice. Cornbread. Bagels, pastries, and croissants. Crackers that contain trans fat.  Vegetables  White potatoes. Corn. Creamed or fried vegetables. Vegetables in a cheese sauce.  Fruits  Dried fruits. Canned fruit in light or heavy syrup. Fruit juice.  Meat and Other Protein Products  Fatty cuts of meat. Ribs, chicken wings, awad, sausage, bologna, salami, chitterlings, fatback, hot dogs, bratwurst, and packaged luncheon meats. Liver and organ meats.  Dairy  Whole or 2% milk, cream, half-and-half, and cream cheese. Whole milk cheeses. Whole-fat or sweetened yogurt. Full-fat cheeses. Nondairy creamers and whipped toppings. Processed cheese, cheese spreads, or cheese curds.  Sweets and Desserts  Corn syrup, sugars, honey, and molasses. Candy. Jam and jelly. Syrup. Sweetened cereals. Cookies, pies, cakes, donuts, muffins, and ice  cream.  Fats and Oils  Butter, stick margarine, lard, shortening, ghee, or awad fat. Coconut, palm kernel, or palm oils.  Beverages  Alcohol. Sweetened drinks (such as sodas, lemonade, and fruit drinks or punches).  The items listed above may not be a complete list of foods and beverages to avoid. Contact your dietitian for more information.  This information is not intended to replace advice given to you by your health care provider. Make sure you discuss any questions you have with your health care provider.  Document Released: 06/18/2013 Document Revised: 08/24/2017 Document Reviewed: 03/19/2015  [employer] Patient Education © 2018 [employer]  BMI for Adults  Body mass index (BMI) is a number that is calculated from a person's weight and height. In most adults, the number is used to find how much of an adult's weight is made up of fat. BMI is not as accurate as a direct measure of body fat.  How is BMI calculated?  BMI is calculated by dividing weight in kilograms by height in meters squared. It can also be calculated by dividing weight in pounds by height in inches squared, then multiplying the resulting number by 703. Charts are available to help you find your BMI quickly and easily without doing this calculation.  How is BMI interpreted?  Health care professionals use BMI charts to identify whether an adult is underweight, at a normal weight, or overweight based on the following guidelines:  · Underweight: BMI less than 18.5.  · Normal weight: BMI between 18.5 and 24.9.  · Overweight: BMI between 25 and 29.9.  · Obese: BMI of 30 and above.    BMI is usually interpreted the same for males and females.  Weight includes both fat and muscle, so someone with a muscular build, such as an athlete, may have a BMI that is higher than 24.9. In cases like these, BMI may not accurately depict body fat. To determine if excess body fat is the cause of a BMI of 25 or higher, further assessments may need to be  done by a health care provider.  Why is BMI a useful tool?  BMI is used to identify a possible weight problem that may be related to a medical problem or may increase the risk for medical problems. BMI can also be used to promote changes to reach a healthy weight.  This information is not intended to replace advice given to you by your health care provider. Make sure you discuss any questions you have with your health care provider.  Document Released: 08/29/2005 Document Revised: 04/27/2017 Document Reviewed: 05/15/2015  [employer] Interactive Patient Education © 2018 [employer]

## 2020-01-16 ENCOUNTER — TELEPHONE (OUTPATIENT)
Dept: CARDIOLOGY | Facility: CLINIC | Age: 35
End: 2020-01-16

## 2020-03-02 DIAGNOSIS — R00.2 PALPITATIONS: ICD-10-CM

## 2020-03-02 DIAGNOSIS — I10 ESSENTIAL HYPERTENSION: ICD-10-CM

## 2020-03-02 RX ORDER — ATENOLOL 25 MG/1
25 TABLET ORAL 2 TIMES DAILY
Qty: 60 TABLET | Refills: 0 | Status: SHIPPED | OUTPATIENT
Start: 2020-03-02 | End: 2020-08-04 | Stop reason: SDUPTHER

## 2020-03-02 NOTE — TELEPHONE ENCOUNTER
Patient called to schedule an appt on 3/23/20  and needs enough Atenolol to last until then. Rx sent.  , TAHIR

## 2020-03-24 ENCOUNTER — TELEPHONE (OUTPATIENT)
Dept: CARDIOLOGY | Facility: CLINIC | Age: 35
End: 2020-03-24

## 2020-07-31 ENCOUNTER — TELEPHONE (OUTPATIENT)
Dept: CARDIOLOGY | Facility: CLINIC | Age: 35
End: 2020-07-31

## 2020-07-31 NOTE — TELEPHONE ENCOUNTER
Patient left a msg that she needs a refill on Atenolol. I called her back at [phone] and notified her that her last visit was 2/2019 so we would need to get her scheduled and will be able to give her enough meds to last until then. Pt said this was okay, but when I put her on hold to get the front office staff on the phone for the appt the patient had hung up. Will not send any meds in at this time since patient is aware that she needs to schedule.  TAHIR FOX

## 2020-08-04 ENCOUNTER — OFFICE VISIT (OUTPATIENT)
Dept: CARDIOLOGY | Facility: CLINIC | Age: 35
End: 2020-08-04

## 2020-08-04 VITALS
OXYGEN SATURATION: 98 % | SYSTOLIC BLOOD PRESSURE: 109 MMHG | HEART RATE: 60 BPM | DIASTOLIC BLOOD PRESSURE: 49 MMHG | WEIGHT: 143 LBS | HEIGHT: 65 IN | BODY MASS INDEX: 23.82 KG/M2

## 2020-08-04 DIAGNOSIS — R00.2 PALPITATIONS: ICD-10-CM

## 2020-08-04 DIAGNOSIS — I10 ESSENTIAL HYPERTENSION: ICD-10-CM

## 2020-08-04 DIAGNOSIS — R07.9 CHEST PAIN, UNSPECIFIED TYPE: ICD-10-CM

## 2020-08-04 DIAGNOSIS — R00.2 PALPITATIONS: Primary | ICD-10-CM

## 2020-08-04 DIAGNOSIS — R06.02 SHORTNESS OF BREATH: ICD-10-CM

## 2020-08-04 PROCEDURE — 93000 ELECTROCARDIOGRAM COMPLETE: CPT | Performed by: PHYSICIAN ASSISTANT

## 2020-08-04 PROCEDURE — 99213 OFFICE O/P EST LOW 20 MIN: CPT | Performed by: PHYSICIAN ASSISTANT

## 2020-08-04 RX ORDER — ATENOLOL 25 MG/1
25 TABLET ORAL 2 TIMES DAILY
Qty: 60 TABLET | Refills: 5 | Status: SHIPPED | OUTPATIENT
Start: 2020-08-04 | End: 2020-11-20 | Stop reason: SDUPTHER

## 2020-08-04 NOTE — PROGRESS NOTES
Problem list     Subjective   Rafat Barrera is a 34 y.o. female     Chief Complaint   Patient presents with   • Follow-up   Problem list  1. Chest pain  1.1 Stress test negative 2015 but EKG changes worrisome but no diagnostic for ischemia  1.2 No CAD noted by recent cardiac CT, April 2015.  2. Palpitations  3. Dyspnea  4. Tobacco Habituation, smokes 1/2 a pack a day  5. GERD  6.  Nonobstructive lower extremity arterial 2017  7.  Negative tilt table test March 2018    HPI    Patient is a 34-year-old female presents back to the office for routine follow-up.  We follow her in the setting of atypical chest pain but also palpitations.    Patient is done well.  She occasionally will feel a pain in her chest near the parasternal region in the left sternal border.  This will occur and be sharp in nature radiates to the right side of her chest.  She will then feel it in her mid upper back.  This happens at random and she describes to me that she feels it is related to stress.  He does not associate with any type of physical activity or cardiovascular exercise.  This occurs rather at random.    She has shortness of breath with this but no significant dyspnea on exertion.  No PND orthopnea.    She continues to palpitate but has done well on low-dose beta-blocker therapy.  For now she is doing well.  She does not describe any presyncope or syncope.      Current Outpatient Medications on File Prior to Visit   Medication Sig Dispense Refill   • aspirin 81 MG tablet Take 1 tablet by mouth daily.     • atenolol (TENORMIN) 25 MG tablet Take 1 tablet by mouth 2 (Two) Times a Day. 60 tablet 0   • atorvastatin (LIPITOR) 20 MG tablet Take 20 mg by mouth Every Night.     • Cetirizine HCl (ZYRTEC ALLERGY PO) Take  by mouth.     • dicyclomine (BENTYL) 10 MG capsule prn  2   • gabapentin (NEURONTIN) 400 MG capsule 3 (Three) Times a Day.     • Loratadine (CLARITIN) 10 MG capsule Take  by mouth daily.     • nitroglycerin (NITROSTAT) 0.4  "MG SL tablet Place  under the tongue.     • omeprazole (priLOSEC) 20 MG capsule   3     No current facility-administered medications on file prior to visit.        Latex and Sulfa antibiotics    Past Medical History:   Diagnosis Date   • Abdominal pain    • Chest pain    • Fatigue    • Gastroenteritis    • GERD (gastroesophageal reflux disease)    • Hyperlipidemia    • Hypertension    • Palpitations    • Shortness of breath        Social History     Socioeconomic History   • Marital status: Single     Spouse name: Not on file   • Number of children: Not on file   • Years of education: Not on file   • Highest education level: Not on file   Tobacco Use   • Smoking status: Current Every Day Smoker     Packs/day: 1.00     Types: Cigarettes   • Smokeless tobacco: Never Used   • Tobacco comment: 1/2 ppd   Substance and Sexual Activity   • Alcohol use: No   • Drug use: No     Comment: History of drug abuse       Family History   Problem Relation Age of Onset   • Diabetes Mother    • Hyperlipidemia Mother    • Heart attack Father    • Hypertension Father    • Heart attack Maternal Aunt    • Heart attack Maternal Uncle    • Heart failure Other         congestive   • Stroke Other    • Sudden death Other        Review of Systems   Constitutional: Positive for fatigue (\"Always\").   HENT: Negative for congestion, rhinorrhea and sore throat.    Respiratory: Positive for shortness of breath (w/ normal daily activity ). Negative for chest tightness.    Cardiovascular: Positive for chest pain (Occasional CP, states it's usually sharp and on the right side ), palpitations and leg swelling (BLE edema, goes down overnight ).   Gastrointestinal: Positive for abdominal pain (\"always\"), constipation (back and forth- IBS), diarrhea (back and forth- IBS) and nausea (\"always\" ). Negative for vomiting.   Endocrine: Positive for heat intolerance. Negative for cold intolerance.   Genitourinary: Positive for frequency. Negative for difficulty " "urinating, dysuria and urgency.   Musculoskeletal: Positive for arthralgias and back pain.   Skin: Positive for wound (spider bite on left foot ). Negative for rash.   Allergic/Immunologic: Positive for environmental allergies. Negative for food allergies.   Neurological: Positive for dizziness, numbness (right arm from finger tips to shoulder blade and both feet ) and headaches. Negative for syncope, weakness and light-headedness.   Hematological: Bruises/bleeds easily.   Psychiatric/Behavioral: Negative for sleep disturbance (Denies SoA at HS).   All other systems reviewed and are negative.      Objective   Vitals:    08/04/20 0855   BP: 109/49   Pulse: 60   SpO2: 98%   Weight: 64.9 kg (143 lb)   Height: 165.1 cm (65\")      /49   Pulse 60   Ht 165.1 cm (65\")   Wt 64.9 kg (143 lb)   SpO2 98%   BMI 23.80 kg/m²     Lab Results (most recent)     None          Physical Exam   Constitutional: She is oriented to person, place, and time. She appears well-developed and well-nourished. No distress.   HENT:   Head: Normocephalic and atraumatic.   Eyes: Conjunctivae are normal. Right eye exhibits no discharge. Left eye exhibits no discharge. No scleral icterus.   Neck: No JVD present.   Cardiovascular: Normal rate, regular rhythm and normal heart sounds. Exam reveals no gallop and no friction rub.   No murmur heard.  Pulmonary/Chest: Effort normal and breath sounds normal. No respiratory distress. She has no wheezes. She has no rales. She exhibits no tenderness.   Musculoskeletal: Normal range of motion. She exhibits no edema.   Neurological: She is alert and oriented to person, place, and time. No cranial nerve deficit.   Skin: Skin is warm and dry. No rash noted. No erythema. No pallor.   Psychiatric: She has a normal mood and affect. Her behavior is normal.   Nursing note and vitals reviewed.      Procedure     ECG 12 Lead  Date/Time: 8/4/2020 8:55 AM  Performed by: Bhupinder Barfield PA  Authorized by: Lico, " REBECCA George   Comparison: compared with previous ECG from 3/6/2018  Comments: EKG demonstrates sinus bradycardia 40 bpm with no acute ST changes               Assessment/Plan     Problems Addressed this Visit        Cardiovascular and Mediastinum    Palpitations - Primary    Relevant Orders    ECG 12 Lead       Respiratory    Shortness of breath    Relevant Orders    ECG 12 Lead       Nervous and Auditory    Chest pain          Recommendation  1.  Patient with atypical chest pain.  We discussed testing although I do feel that it is likely musculoskeletal related anxiety.  She would rather monitor symptoms.  If this was to change or worsen, she is to contact our office    2.  Patient with palpitations but doing well on atenolol.  She does not describe symptoms of malignant arrhythmia.  For now we will continue    3.  Patient with dyspnea that is minimal.  For now all of her symptoms are chronic without progression or change and has history of normal coronaries.  If any of this was to worsen, she is to contact our office as discussed.  Otherwise she would rather monitor symptoms.  We will see her back in 6 months to year.  She will follow-up with primary as scheduled           Rafat Barrera  reports that she has been smoking cigarettes. She has been smoking about 1.00 pack per day. She has never used smokeless tobacco.. I have educated her on the risk of diseases from using tobacco products such as cancer, COPD and heart diease.     I advised her to quit and she is not willing to quit.       Patient's Body mass index is 23.8 kg/m². BMI is within normal parameters. No follow-up required..       Electronically signed by:

## 2020-08-04 NOTE — PATIENT INSTRUCTIONS
Steps to Quit Smoking  Smoking tobacco is the leading cause of preventable death. It can affect almost every organ in the body. Smoking puts you and people around you at risk for many serious, long-lasting (chronic) diseases. Quitting smoking can be hard, but it is one of the best things that you can do for your health. It is never too late to quit.  How do I get ready to quit?  When you decide to quit smoking, make a plan to help you succeed. Before you quit:  · Pick a date to quit. Set a date within the next 2 weeks to give you time to prepare.  · Write down the reasons why you are quitting. Keep this list in places where you will see it often.  · Tell your family, friends, and co-workers that you are quitting. Their support is important.  · Talk with your doctor about the choices that may help you quit.  · Find out if your health insurance will pay for these treatments.  · Know the people, places, things, and activities that make you want to smoke (triggers). Avoid them.  What first steps can I take to quit smoking?  · Throw away all cigarettes at home, at work, and in your car.  · Throw away the things that you use when you smoke, such as ashtrays and lighters.  · Clean your car. Make sure to empty the ashtray.  · Clean your home, including curtains and carpets.  What can I do to help me quit smoking?  Talk with your doctor about taking medicines and seeing a counselor at the same time. You are more likely to succeed when you do both.  · If you are pregnant or breastfeeding, talk with your doctor about counseling or other ways to quit smoking. Do not take medicine to help you quit smoking unless your doctor tells you to do so.  To quit smoking:  Quit right away  · Quit smoking totally, instead of slowly cutting back on how much you smoke over a period of time.  · Go to counseling. You are more likely to quit if you go to counseling sessions regularly.  Take medicine  You may take medicines to help you quit. Some  medicines need a prescription, and some you can buy over-the-counter. Some medicines may contain a drug called nicotine to replace the nicotine in cigarettes. Medicines may:  · Help you to stop having the desire to smoke (cravings).  · Help to stop the problems that come when you stop smoking (withdrawal symptoms).  Your doctor may ask you to use:  · Nicotine patches, gum, or lozenges.  · Nicotine inhalers or sprays.  · Non-nicotine medicine that is taken by mouth.  Find resources  Find resources and other ways to help you quit smoking and remain smoke-free after you quit. These resources are most helpful when you use them often. They include:  · Online chats with a counselor.  · Phone quitlines.  · Printed self-help materials.  · Support groups or group counseling.  · Text messaging programs.  · Mobile phone apps. Use apps on your mobile phone or tablet that can help you stick to your quit plan. There are many free apps for mobile phones and tablets as well as websites. Examples include Quit Guide from the CDC and smokefree.gov    What things can I do to make it easier to quit?    · Talk to your family and friends. Ask them to support and encourage you.  · Call a phone quitline (9-903-QUITNOW), reach out to support groups, or work with a counselor.  · Ask people who smoke to not smoke around you.  · Avoid places that make you want to smoke, such as:  ? Bars.  ? Parties.  ? Smoke-break areas at work.  · Spend time with people who do not smoke.  · Lower the stress in your life. Stress can make you want to smoke. Try these things to help your stress:  ? Getting regular exercise.  ? Doing deep-breathing exercises.  ? Doing yoga.  ? Meditating.  ? Doing a body scan. To do this, close your eyes, focus on one area of your body at a time from head to toe. Notice which parts of your body are tense. Try to relax the muscles in those areas.  How will I feel when I quit smoking?  Day 1 to 3 weeks  Within the first 24 hours,  you may start to have some problems that come from quitting tobacco. These problems are very bad 2-3 days after you quit, but they do not often last for more than 2-3 weeks. You may get these symptoms:  · Mood swings.  · Feeling restless, nervous, angry, or annoyed.  · Trouble concentrating.  · Dizziness.  · Strong desire for high-sugar foods and nicotine.  · Weight gain.  · Trouble pooping (constipation).  · Feeling like you may vomit (nausea).  · Coughing or a sore throat.  · Changes in how the medicines that you take for other issues work in your body.  · Depression.  · Trouble sleeping (insomnia).  Week 3 and afterward  After the first 2-3 weeks of quitting, you may start to notice more positive results, such as:  · Better sense of smell and taste.  · Less coughing and sore throat.  · Slower heart rate.  · Lower blood pressure.  · Clearer skin.  · Better breathing.  · Fewer sick days.  Quitting smoking can be hard. Do not give up if you fail the first time. Some people need to try a few times before they succeed. Do your best to stick to your quit plan, and talk with your doctor if you have any questions or concerns.  Summary  · Smoking tobacco is the leading cause of preventable death. Quitting smoking can be hard, but it is one of the best things that you can do for your health.  · When you decide to quit smoking, make a plan to help you succeed.  · Quit smoking right away, not slowly over a period of time.  · When you start quitting, seek help from your doctor, family, or friends.  This information is not intended to replace advice given to you by your health care provider. Make sure you discuss any questions you have with your health care provider.  Document Released: 10/14/2010 Document Revised: 03/06/2020 Document Reviewed: 03/07/2020  [employer] Patient Education © 2020 [employer]  How to Quarantine at Home  Information for Patients and Families    These instructions are for people with confirmed or  suspected COVID-19 who do not need to be hospitalized and those with confirmed COVID-19 who were hospitalized and discharged to care for themselves at home.    If you were tested through the Health Department  The Health Department will monitor your wellbeing.  If it is determined that you do not need to be hospitalized and can be isolated at home, you will be monitored by staff from your local or state health department.     If you were tested through a [facility]  You will need to monitor yourself and report changes in your symptoms to your doctor.  See the section below called Monitor Your Symptoms.    Follow these steps until a healthcare provider or local or state health department says you can return to your normal activities.    Stay home except to get medical care  • Restrict activities outside your home, except for getting medical care.   • Do not go to work, school, or public areas.   • Avoid using public transportation, ride-sharing, or taxis.    Separate yourself from other people and animals in your home  People  As much as possible, you should stay in a specific room and away from other people in your home. Also, you should use a separate bathroom, if available.    Animals  You should restrict contact with pets and other animals while you are sick with COVID-19, just like you would around other people. When possible, have another member of your household care for your animals while you are sick. If you are sick with COVID-19, avoid contact with your pet, including petting, snuggling, being kissed or licked, and sharing food. If you must care for your pet or be around animals while you are sick, wash your hands before and after you interact with pets and wear a facemask. See COVID-19 and Animals for more information.    Call ahead before visiting your doctor  If you have a medical appointment, call the healthcare provider and tell them that you have or may have COVID-19. This information will help  the healthcare provider’s office take steps to keep other people from getting infected or exposed.    Wear a facemask  You should wear a facemask when you are around other people (e.g., sharing a room or vehicle) or pets and before you enter a healthcare provider’s office.     If you are not able to wear a facemask (for example, because it causes trouble breathing), then people who live with you should not stay in the same room with you, or they should wear a facemask if they enter your room.    Cover your coughs and sneezes  • Cover your mouth and nose with a tissue when you cough or sneeze.   • Throw used tissues in a lined trash can.   • Immediately wash your hands with soap and water for at least 20 seconds or, if soap and water are not available, clean your hands with an alcohol-based hand  that contains at least 60% alcohol.    Clean your hands often  • Wash your hands often with soap and water for at least 20 seconds, especially after blowing your nose, coughing, or sneezing; going to the bathroom; and before eating or preparing food.     • If soap and water are not readily available, use an alcohol-based hand  with at least 60% alcohol, covering all surfaces of your hands and rubbing them together until they feel dry.    • Soap and water are the best option if hands are visibly dirty. Avoid touching your eyes, nose, and mouth with unwashed hands.    Avoid sharing personal household items  • You should not share dishes, drinking glasses, cups, eating utensils, towels, or bedding with other people or pets in your home.   • After using these items, they should be washed thoroughly with soap and water.    Clean all “high-touch” surfaces everyday  • High touch surfaces include counters, tabletops, doorknobs, bathroom fixtures, toilets, phones, keyboards, tablets, and bedside tables.   • Also, clean any surfaces that may have blood, stool, or body fluids on them.   • Use a household cleaning  spray or wipe, according to the label instructions. Labels contain instructions for safe and effective use of the cleaning product, including precautions you should take when applying the product, such as wearing gloves and making sure you have good ventilation during use of the product.    Monitor your symptoms  • Seek prompt medical attention if your illness is worsening (e.g., difficulty breathing).   • Before seeking care, call your healthcare provider and tell them that you have, or are being evaluated for, COVID-19.   • Put on a facemask before you enter the facility.     • These steps will help the healthcare provider’s office to keep other people in the office or waiting room from getting infected or exposed.   • Persons who are placed under active monitoring or facilitated self-monitoring should follow instructions provided by their local health department or occupational health professionals, as appropriate.  • If you have a medical emergency and need to call 911, notify the dispatch personnel that you have, or are being evaluated for COVID-19. If possible, put on a facemask before emergency medical services arrive.    Discontinuing home isolation  Patients with confirmed COVID-19 should remain under home isolation precautions until the risk of secondary transmission to others is thought to be low. The decision to discontinue home isolation precautions should be made on a case-by-case basis, in consultation with healthcare providers and state and local health departments.    The below content are for household members, intimate partners, and caregivers of a patient with symptomatic laboratory-confirmed COVID-19 or a patient under investigation:    Household members, intimate partners, and caregivers may have close contact with a person with symptomatic, laboratory-confirmed COVID-19 or a person under investigation.     Close contacts should monitor their health; they should call their healthcare provider  right away if they develop symptoms suggestive of COVID-19 (e.g., fever, cough, shortness of breath)     Close contacts should also follow these recommendations:  • Make sure that you understand and can help the patient follow their healthcare provider’s instructions for medication(s) and care. You should help the patient with basic needs in the home and provide support for getting groceries, prescriptions, and other personal needs.  • Monitor the patient’s symptoms. If the patient is getting sicker, call his or her healthcare provider and tell them that the patient has laboratory-confirmed COVID-19. This will help the healthcare provider’s office take steps to keep other people in the office or waiting room from getting infected. Ask the healthcare provider to call the local or state health department for additional guidance. If the patient has a medical emergency and you need to call 911, notify the dispatch personnel that the patient has, or is being evaluated for COVID-19.  • Household members should stay in another room or be  from the patient as much as possible. Household members should use a separate bedroom and bathroom, if available.  • Prohibit visitors who do not have an essential need to be in the home.  • Household members should care for any pets in the home. Do not handle pets or other animals while sick.  For more information, see COVID-19 and Animals.  • Make sure that shared spaces in the home have good air flow, such as by an air conditioner or an opened window, weather permitting.  • Perform hand hygiene frequently. Wash your hands often with soap and water for at least 20 seconds or use an alcohol-based hand  that contains 60 to 95% alcohol, covering all surfaces of your hands and rubbing them together until they feel dry. Soap and water should be used preferentially if hands are visibly dirty.  • Avoid touching your eyes, nose, and mouth with unwashed hands.  • The patient  should wear a facemask when you are around other people. If the patient is not able to wear a facemask (for example, because it causes trouble breathing), you, as the caregiver, should wear a mask when you are in the same room as the patient.  • Wear a disposable facemask and gloves when you touch or have contact with the patient’s blood, stool, or body fluids, such as saliva, sputum, nasal mucus, vomit, or urine.   o Throw out disposable facemasks and gloves after using them. Do not reuse.  o When removing personal protective equipment, first remove and dispose of gloves. Then, immediately clean your hands with soap and water or alcohol-based hand . Next, remove and dispose of facemask, and immediately clean your hands again with soap and water or alcohol-based hand .  • Avoid sharing household items with the patient. You should not share dishes, drinking glasses, cups, eating utensils, towels, bedding, or other items. After the patient uses these items, you should wash them thoroughly (see below “Wash laundry thoroughly”).  • Clean all “high-touch” surfaces, such as counters, tabletops, doorknobs, bathroom fixtures, toilets, phones, keyboards, tablets, and bedside tables, every day. Also, clean any surfaces that may have blood, stool, or body fluids on them.   o Use a household cleaning spray or wipe, according to the label instructions. Labels contain instructions for safe and effective use of the cleaning product including precautions you should take when applying the product, such as wearing gloves and making sure you have good ventilation during use of the product.  • Wash laundry thoroughly.   o Immediately remove and wash clothes or bedding that have blood, stool, or body fluids on them.  o Wear disposable gloves while handling soiled items and keep soiled items away from your body. Clean your hands (with soap and water or an alcohol-based hand ) immediately after removing your  gloves.  o Read and follow directions on labels of laundry or clothing items and detergent. In general, using a normal laundry detergent according to washing machine instructions and dry thoroughly using the warmest temperatures recommended on the clothing label.  • Place all used disposable gloves, facemasks, and other contaminated items in a lined container before disposing of them with other household waste. Clean your hands (with soap and water or an alcohol-based hand ) immediately after handling these items. Soap and water should be used preferentially if hands are visibly dirty.  • Discuss any additional questions with your state or local health department or healthcare provider.    Adapted from information provided by the Centers for Disease Control and Prevention.  For more information, visit https://www.cdc.gov/coronavirus/2019-ncov/hcp/guidance-prevent-spread.html

## 2020-11-20 DIAGNOSIS — I10 ESSENTIAL HYPERTENSION: ICD-10-CM

## 2020-11-20 DIAGNOSIS — R00.2 PALPITATIONS: ICD-10-CM

## 2020-11-23 RX ORDER — ATENOLOL 25 MG/1
25 TABLET ORAL 2 TIMES DAILY
Qty: 60 TABLET | Refills: 5 | Status: SHIPPED | OUTPATIENT
Start: 2020-11-23 | End: 2021-02-04 | Stop reason: SDUPTHER

## 2021-02-04 ENCOUNTER — CLINICAL SUPPORT (OUTPATIENT)
Dept: CARDIOLOGY | Facility: CLINIC | Age: 36
End: 2021-02-04

## 2021-02-04 VITALS
DIASTOLIC BLOOD PRESSURE: 35 MMHG | HEIGHT: 65 IN | BODY MASS INDEX: 25.92 KG/M2 | HEART RATE: 87 BPM | WEIGHT: 155.6 LBS | SYSTOLIC BLOOD PRESSURE: 97 MMHG | OXYGEN SATURATION: 98 %

## 2021-02-04 DIAGNOSIS — R07.9 CHEST PAIN, UNSPECIFIED TYPE: ICD-10-CM

## 2021-02-04 DIAGNOSIS — R06.02 SHORTNESS OF BREATH: ICD-10-CM

## 2021-02-04 DIAGNOSIS — R94.31 ABNORMAL EKG: ICD-10-CM

## 2021-02-04 DIAGNOSIS — I10 ESSENTIAL HYPERTENSION: ICD-10-CM

## 2021-02-04 DIAGNOSIS — R00.2 PALPITATIONS: Primary | ICD-10-CM

## 2021-02-04 PROCEDURE — 99406 BEHAV CHNG SMOKING 3-10 MIN: CPT | Performed by: PHYSICIAN ASSISTANT

## 2021-02-04 PROCEDURE — 93000 ELECTROCARDIOGRAM COMPLETE: CPT | Performed by: PHYSICIAN ASSISTANT

## 2021-02-04 PROCEDURE — 99214 OFFICE O/P EST MOD 30 MIN: CPT | Performed by: PHYSICIAN ASSISTANT

## 2021-02-04 RX ORDER — NITROGLYCERIN 0.4 MG/1
0.4 TABLET SUBLINGUAL
Qty: 15 TABLET | Refills: 6 | Status: SHIPPED | OUTPATIENT
Start: 2021-02-04

## 2021-02-04 RX ORDER — CLONIDINE HYDROCHLORIDE 0.1 MG/1
0.1 TABLET ORAL AS NEEDED
COMMUNITY
End: 2021-03-30

## 2021-02-04 RX ORDER — BUPRENORPHINE HYDROCHLORIDE AND NALOXONE HYDROCHLORIDE DIHYDRATE 8; 2 MG/1; MG/1
1 TABLET SUBLINGUAL DAILY
COMMUNITY

## 2021-02-04 RX ORDER — ESCITALOPRAM OXALATE 20 MG/1
20 TABLET ORAL DAILY
COMMUNITY
End: 2021-03-30

## 2021-02-04 RX ORDER — OMEPRAZOLE 20 MG/1
20 CAPSULE, DELAYED RELEASE ORAL DAILY
Qty: 30 CAPSULE | Refills: 1 | Status: SHIPPED | OUTPATIENT
Start: 2021-02-04 | End: 2021-04-01

## 2021-02-04 RX ORDER — ATORVASTATIN CALCIUM 20 MG/1
20 TABLET, FILM COATED ORAL NIGHTLY
Qty: 30 TABLET | Refills: 1 | Status: SHIPPED | OUTPATIENT
Start: 2021-02-04 | End: 2021-03-04

## 2021-02-04 RX ORDER — ATENOLOL 25 MG/1
25 TABLET ORAL DAILY
Qty: 60 TABLET | Refills: 5 | Status: SHIPPED | OUTPATIENT
Start: 2021-02-04

## 2021-02-04 NOTE — PROGRESS NOTES
Problem list     Subjective   Rafat Barrera is a 35 y.o. female     Chief Complaint   Patient presents with   • Nurse visit     EKG     Problem list  1. Chest pain  1.1 Stress test negative 2015 but EKG changes worrisome but no diagnostic for ischemia  1.2 No CAD noted by recent cardiac CT, April 2015.  2. Palpitations  3. Dyspnea  4. Tobacco Habituation, smokes 1/2 a pack a day  5. GERD  6.  Nonobstructive lower extremity arterial 2017  7.  Negative tilt table test March 2018    HPI    Patient is a 35-year-old female that presents to the office for evaluation.  Patient was in the emergency room this week because of chest discomfort.  She has had chronic chest discomfort.  We have had a work-up in the past including a cardiac CT in April 2015 which coronaries were normal.  She has had palpitations.  She has wore an event monitor in the past showing no significant arrhythmia.  She was treated for tachycardia which she seems to be symptomatic from as no potential source of her tachycardia could be identified.    She started developing chest discomfort that was worse.  She felt a left-sided chest pressure and heaviness as a pressing or something sitting on her chest.  She went to the emergency room was found to be hypotensive.  She has been on 50 mg of atenolol.  It was recommended she follow-up with us about medications adjusted    She had felt some shortness of breath in the past but does not describe progressive or significant exertional dyspnea.  She does not describe PND orthopnea.    She does occasionally feel palpitations.  She notices a fluttering sensation at times.  She feels this is more noticeable now than in the past.  She does not describe any syncopal episodes.  She otherwise is stable      Current Outpatient Medications on File Prior to Visit   Medication Sig Dispense Refill   • aspirin 81 MG tablet Take 1 tablet by mouth daily.     • buprenorphine-naloxone (SUBOXONE) 8-2 MG per SL tablet Place 1  tablet under the tongue Daily.     • Cetirizine HCl (ZYRTEC ALLERGY PO) Take  by mouth.     • cloNIDine (CATAPRES) 0.1 MG tablet Take 0.1 mg by mouth As Needed.     • dicyclomine (BENTYL) 10 MG capsule prn  2   • escitalopram (LEXAPRO) 20 MG tablet Take 20 mg by mouth Daily.     • gabapentin (NEURONTIN) 400 MG capsule 3 (Three) Times a Day.     • omeprazole (priLOSEC) 20 MG capsule   3   • [DISCONTINUED] atenolol (TENORMIN) 25 MG tablet Take 1 tablet by mouth 2 (Two) Times a Day. 60 tablet 5   • [DISCONTINUED] nitroglycerin (NITROSTAT) 0.4 MG SL tablet Place  under the tongue.     • atorvastatin (LIPITOR) 20 MG tablet Take 20 mg by mouth Every Night.     • Loratadine (CLARITIN) 10 MG capsule Take  by mouth daily.       No current facility-administered medications on file prior to visit.        Latex and Sulfa antibiotics    Past Medical History:   Diagnosis Date   • Abdominal pain    • Chest pain    • Fatigue    • Gastroenteritis    • GERD (gastroesophageal reflux disease)    • Hyperlipidemia    • Hypertension    • Palpitations    • Shortness of breath        Social History     Socioeconomic History   • Marital status: Single     Spouse name: Not on file   • Number of children: Not on file   • Years of education: Not on file   • Highest education level: Not on file   Tobacco Use   • Smoking status: Current Every Day Smoker     Packs/day: 1.00     Types: Cigarettes   • Smokeless tobacco: Never Used   • Tobacco comment: 1/2 ppd   Substance and Sexual Activity   • Alcohol use: No   • Drug use: No     Comment: History of drug abuse       Family History   Problem Relation Age of Onset   • Diabetes Mother    • Hyperlipidemia Mother    • Heart attack Father    • Hypertension Father    • Heart attack Maternal Aunt    • Heart attack Maternal Uncle    • Heart failure Other         congestive   • Stroke Other    • Sudden death Other        Review of Systems   Constitutional: Positive for fatigue. Negative for chills and fever.  "  HENT: Positive for rhinorrhea. Negative for congestion and sore throat.    Eyes: Positive for visual disturbance (glasses).   Respiratory: Positive for chest tightness and shortness of breath (with palps). Negative for apnea.    Cardiovascular: Positive for chest pain (sharp/dull pain on left side of chest- radiates into the middle of chest. pain in between shoulder blades) and palpitations. Negative for leg swelling.   Gastrointestinal: Positive for nausea and vomiting. Negative for constipation and diarrhea.   Endocrine: Negative.    Genitourinary: Negative.    Musculoskeletal: Positive for back pain, neck pain and neck stiffness. Negative for gait problem.   Skin: Negative.  Negative for rash and wound.   Allergic/Immunologic: Positive for environmental allergies (seasonal allergies). Negative for food allergies.   Neurological: Positive for dizziness, weakness, light-headedness and headaches. Negative for numbness.   Hematological: Bruises/bleeds easily (bruise/bleed).   Psychiatric/Behavioral: Positive for sleep disturbance (difficulty falling/staying asleep- denies SoA at HS).       Objective   Vitals:    02/04/21 0843   BP: (!) 97/35   BP Location: Left arm   Patient Position: Sitting   Pulse: 87   SpO2: 98%   Weight: 70.6 kg (155 lb 9.6 oz)   Height: 165.1 cm (65\")      BP (!) 97/35 (BP Location: Left arm, Patient Position: Sitting)   Pulse 87   Ht 165.1 cm (65\")   Wt 70.6 kg (155 lb 9.6 oz)   SpO2 98%   BMI 25.89 kg/m²     Lab Results (most recent)     None          Physical Exam  Vitals signs and nursing note reviewed.   Constitutional:       General: She is not in acute distress.     Appearance: Normal appearance. She is well-developed.   HENT:      Head: Normocephalic and atraumatic.   Eyes:      General: No scleral icterus.        Right eye: No discharge.         Left eye: No discharge.      Conjunctiva/sclera: Conjunctivae normal.   Neck:      Vascular: No carotid bruit.   Cardiovascular:      " Rate and Rhythm: Normal rate and regular rhythm.      Heart sounds: Normal heart sounds. No murmur. No friction rub. No gallop.    Pulmonary:      Effort: Pulmonary effort is normal. No respiratory distress.      Breath sounds: Normal breath sounds. No wheezing or rales.   Chest:      Chest wall: No tenderness.   Musculoskeletal:      Right lower leg: No edema.      Left lower leg: No edema.   Skin:     General: Skin is warm and dry.      Coloration: Skin is not pale.      Findings: No erythema or rash.   Neurological:      Mental Status: She is alert and oriented to person, place, and time.      Cranial Nerves: No cranial nerve deficit.   Psychiatric:         Behavior: Behavior normal.         Procedure     ECG 12 Lead    Date/Time: 2/4/2021 8:56 AM  Performed by: Bhupinder Barfield PA  Authorized by: Bhupinder Barfield PA   Comparison: compared with previous ECG from 8/4/2020  Comments: EKG demonstrates sinus rhythm at 64 bpm with nonspecific ST depression most notable in the inferior leads at baseline               Assessment/Plan     Problems Addressed this Visit        Other    Chest pain    Relevant Orders    ECG 12 Lead    Adult Transthoracic Echo Complete W/ Cont if Necessary Per Protocol    Stress Test With Myocardial Perfusion One Day    Cardiac Event Monitor    D-dimer, Quantitative    Palpitations - Primary    Relevant Medications    atenolol (TENORMIN) 25 MG tablet    Other Relevant Orders    ECG 12 Lead    Adult Transthoracic Echo Complete W/ Cont if Necessary Per Protocol    Stress Test With Myocardial Perfusion One Day    Cardiac Event Monitor    D-dimer, Quantitative    Shortness of breath    Relevant Orders    ECG 12 Lead    Adult Transthoracic Echo Complete W/ Cont if Necessary Per Protocol    Stress Test With Myocardial Perfusion One Day    Cardiac Event Monitor    D-dimer, Quantitative    Abnormal EKG    Relevant Orders    Adult Transthoracic Echo Complete W/ Cont if Necessary Per Protocol     Stress Test With Myocardial Perfusion One Day    Cardiac Event Monitor    D-dimer, Quantitative    Essential hypertension    Relevant Medications    cloNIDine (CATAPRES) 0.1 MG tablet    atenolol (TENORMIN) 25 MG tablet    Other Relevant Orders    Adult Transthoracic Echo Complete W/ Cont if Necessary Per Protocol    Stress Test With Myocardial Perfusion One Day    Cardiac Event Monitor    D-dimer, Quantitative      Diagnoses       Codes Comments    Palpitations    -  Primary ICD-10-CM: R00.2  ICD-9-CM: 785.1     Shortness of breath     ICD-10-CM: R06.02  ICD-9-CM: 786.05     Chest pain, unspecified type     ICD-10-CM: R07.9  ICD-9-CM: 786.50     Essential hypertension     ICD-10-CM: I10  ICD-9-CM: 401.9     Abnormal EKG     ICD-10-CM: R94.31  ICD-9-CM: 794.31           Recommendation  1.  Patient is a 35-year-old female had recent emergency room visit.  Work-up 5 years ago was benign.  She is interested in repeating a cardiac evaluation.  Her substernal discomfort is concerning.  Being hypotensive, we discussed that this could have caused her chest discomfort.  She would like repeat testing    2.  She has EKG showing ST depression at baseline.  Therefore, feel nuclear testing would be helpful as there is already a ST depression abnormality at baseline    3.  We will schedule echocardiogram to evaluate LV structure and function rule out pericardial great vessel disease    4.  Event monitor to rule out any arrhythmic substrate    5.  On physical examination, she describes when she takes a deep breath that she will have pain in her back.  I will order a D-dimer just to exclude pulmonary embolus    6.  I am decreasing her atenolol to 25 mg daily because of her hypotension.  Instructions given for her to monitor blood pressure closely.  We may have to ultimately discontinue that medication as it was only used for symptomatic relief anyway and we want to avoid adverse effects.  Instructions to the patient to monitor  blood pressure and to hold if blood pressure is low.  We will see her back for follow-up after testing.  She is to follow-up with primary as scheduled and call our office for any worsening symptoms.  She is to report to the emergency room for any persistent chest discomfort.           Rafat Barrera  reports that she has been smoking cigarettes. She has been smoking about 1.00 pack per day. She has never used smokeless tobacco.. I have educated her on the risk of diseases from using tobacco products such as cancer, COPD and heart disease.     I advised her to quit and she is not willing to quit.    I spent 3  minutes counseling the patient.          Patient's Body mass index is 25.89 kg/m². BMI is within normal parameters. No follow-up required..       Electronically signed by:

## 2021-03-04 DIAGNOSIS — E78.2 MIXED HYPERLIPIDEMIA: Primary | ICD-10-CM

## 2021-03-04 RX ORDER — ATORVASTATIN CALCIUM 20 MG/1
TABLET, FILM COATED ORAL
Qty: 30 TABLET | Refills: 5 | Status: SHIPPED | OUTPATIENT
Start: 2021-03-04

## 2021-03-04 RX ORDER — OMEPRAZOLE 20 MG/1
CAPSULE, DELAYED RELEASE ORAL
Qty: 30 CAPSULE | Refills: 1 | OUTPATIENT
Start: 2021-03-04

## 2021-03-19 ENCOUNTER — HOSPITAL ENCOUNTER (OUTPATIENT)
Dept: CARDIOLOGY | Facility: HOSPITAL | Age: 36
Discharge: HOME | End: 2021-03-19

## 2021-03-19 DIAGNOSIS — R06.02 SHORTNESS OF BREATH: ICD-10-CM

## 2021-03-19 DIAGNOSIS — I10 ESSENTIAL HYPERTENSION: ICD-10-CM

## 2021-03-19 DIAGNOSIS — R00.2 PALPITATIONS: ICD-10-CM

## 2021-03-19 DIAGNOSIS — R07.9 CHEST PAIN, UNSPECIFIED TYPE: ICD-10-CM

## 2021-03-19 DIAGNOSIS — R94.31 ABNORMAL EKG: ICD-10-CM

## 2021-03-19 PROCEDURE — A9500 TC99M SESTAMIBI: HCPCS | Performed by: INTERNAL MEDICINE

## 2021-03-19 PROCEDURE — 78452 HT MUSCLE IMAGE SPECT MULT: CPT

## 2021-03-19 PROCEDURE — 78452 HT MUSCLE IMAGE SPECT MULT: CPT | Performed by: INTERNAL MEDICINE

## 2021-03-19 PROCEDURE — 93017 CV STRESS TEST TRACING ONLY: CPT

## 2021-03-19 PROCEDURE — 25010000002 REGADENOSON 0.4 MG/5ML SOLUTION: Performed by: INTERNAL MEDICINE

## 2021-03-19 PROCEDURE — 93306 TTE W/DOPPLER COMPLETE: CPT

## 2021-03-19 PROCEDURE — 93016 CV STRESS TEST SUPVJ ONLY: CPT | Performed by: NURSE PRACTITIONER

## 2021-03-19 PROCEDURE — 0 TECHNETIUM SESTAMIBI: Performed by: INTERNAL MEDICINE

## 2021-03-19 PROCEDURE — 93306 TTE W/DOPPLER COMPLETE: CPT | Performed by: INTERNAL MEDICINE

## 2021-03-19 PROCEDURE — 25010000002 AMINOPHYLLINE PER 250 MG: Performed by: INTERNAL MEDICINE

## 2021-03-19 PROCEDURE — 93018 CV STRESS TEST I&R ONLY: CPT | Performed by: INTERNAL MEDICINE

## 2021-03-19 RX ORDER — AMINOPHYLLINE DIHYDRATE 25 MG/ML
125 INJECTION, SOLUTION INTRAVENOUS
Status: COMPLETED | OUTPATIENT
Start: 2021-03-19 | End: 2021-03-19

## 2021-03-19 RX ADMIN — TECHNETIUM TC 99M SESTAMIBI 1 DOSE: 1 INJECTION INTRAVENOUS at 09:31

## 2021-03-19 RX ADMIN — AMINOPHYLLINE 125 MG: 25 INJECTION, SOLUTION INTRAVENOUS at 11:10

## 2021-03-19 RX ADMIN — REGADENOSON 0.4 MG: 0.08 INJECTION, SOLUTION INTRAVENOUS at 11:09

## 2021-03-19 RX ADMIN — TECHNETIUM TC 99M SESTAMIBI 1 DOSE: 1 INJECTION INTRAVENOUS at 11:10

## 2021-03-20 LAB
BH CV REST NUCLEAR ISOTOPE DOSE: 10 MCI
BH CV STRESS COMMENTS STAGE 1: NORMAL
BH CV STRESS DOSE REGADENOSON STAGE 1: 0.4
BH CV STRESS DURATION MIN STAGE 1: 0
BH CV STRESS DURATION SEC STAGE 1: 10
BH CV STRESS NUCLEAR ISOTOPE DOSE: 30 MCI
BH CV STRESS PROTOCOL 1: NORMAL
BH CV STRESS RECOVERY BP: NORMAL MMHG
BH CV STRESS RECOVERY HR: 73 BPM
BH CV STRESS STAGE 1: 1
MAXIMAL PREDICTED HEART RATE: 184 BPM
PERCENT MAX PREDICTED HR: 67.93 %
STRESS BASELINE BP: NORMAL MMHG
STRESS BASELINE HR: 59 BPM
STRESS PERCENT HR: 80 %
STRESS POST PEAK BP: NORMAL MMHG
STRESS POST PEAK HR: 125 BPM
STRESS TARGET HR: 156 BPM

## 2021-03-27 LAB
BH CV ECHO MEAS - ACS: 1.9 CM
BH CV ECHO MEAS - AO MAX PG: 7 MMHG
BH CV ECHO MEAS - AO MEAN PG: 4 MMHG
BH CV ECHO MEAS - AO ROOT AREA (BSA CORRECTED): 1.5
BH CV ECHO MEAS - AO ROOT AREA: 5.3 CM^2
BH CV ECHO MEAS - AO ROOT DIAM: 2.6 CM
BH CV ECHO MEAS - AO V2 MAX: 132 CM/SEC
BH CV ECHO MEAS - AO V2 MEAN: 94.3 CM/SEC
BH CV ECHO MEAS - AO V2 VTI: 30.9 CM
BH CV ECHO MEAS - BSA(HAYCOCK): 1.8 M^2
BH CV ECHO MEAS - BSA: 1.8 M^2
BH CV ECHO MEAS - BZI_BMI: 25.8 KILOGRAMS/M^2
BH CV ECHO MEAS - BZI_METRIC_HEIGHT: 165.1 CM
BH CV ECHO MEAS - BZI_METRIC_WEIGHT: 70.3 KG
BH CV ECHO MEAS - EDV(CUBED): 101.2 ML
BH CV ECHO MEAS - EDV(MOD-SP4): 60.2 ML
BH CV ECHO MEAS - EDV(TEICH): 100.3 ML
BH CV ECHO MEAS - EF(CUBED): 72.8 %
BH CV ECHO MEAS - EF(MOD-SP2): 64.4 %
BH CV ECHO MEAS - EF(MOD-SP4): 43.7 %
BH CV ECHO MEAS - EF(TEICH): 64.5 %
BH CV ECHO MEAS - ESV(CUBED): 27.5 ML
BH CV ECHO MEAS - ESV(MOD-SP4): 33.9 ML
BH CV ECHO MEAS - ESV(TEICH): 35.6 ML
BH CV ECHO MEAS - FS: 35.2 %
BH CV ECHO MEAS - IVS/LVPW: 0.73
BH CV ECHO MEAS - IVSD: 0.94 CM
BH CV ECHO MEAS - LA DIMENSION: 3.5 CM
BH CV ECHO MEAS - LA/AO: 1.3
BH CV ECHO MEAS - LV DIASTOLIC VOL/BSA (35-75): 33.9 ML/M^2
BH CV ECHO MEAS - LV IVRT: 0.12 SEC
BH CV ECHO MEAS - LV MASS(C)D: 188.9 GRAMS
BH CV ECHO MEAS - LV MASS(C)DI: 106.4 GRAMS/M^2
BH CV ECHO MEAS - LV SYSTOLIC VOL/BSA (12-30): 19.1 ML/M^2
BH CV ECHO MEAS - LVIDD: 4.7 CM
BH CV ECHO MEAS - LVIDS: 3 CM
BH CV ECHO MEAS - LVLD AP4: 7.3 CM
BH CV ECHO MEAS - LVLS AP4: 6.3 CM
BH CV ECHO MEAS - LVOT AREA (M): 3.1 CM^2
BH CV ECHO MEAS - LVOT AREA: 3.1 CM^2
BH CV ECHO MEAS - LVOT DIAM: 2 CM
BH CV ECHO MEAS - LVPWD: 1.3 CM
BH CV ECHO MEAS - MV A MAX VEL: 34.3 CM/SEC
BH CV ECHO MEAS - MV DEC SLOPE: 132 CM/SEC^2
BH CV ECHO MEAS - MV E MAX VEL: 63.4 CM/SEC
BH CV ECHO MEAS - MV E/A: 1.8
BH CV ECHO MEAS - RVDD: 2.7 CM
BH CV ECHO MEAS - SI(AO): 92.4 ML/M^2
BH CV ECHO MEAS - SI(CUBED): 41.5 ML/M^2
BH CV ECHO MEAS - SI(MOD-SP4): 14.8 ML/M^2
BH CV ECHO MEAS - SI(TEICH): 36.5 ML/M^2
BH CV ECHO MEAS - SV(AO): 164.1 ML
BH CV ECHO MEAS - SV(CUBED): 73.7 ML
BH CV ECHO MEAS - SV(MOD-SP4): 26.3 ML
BH CV ECHO MEAS - SV(TEICH): 64.8 ML
MAXIMAL PREDICTED HEART RATE: 184 BPM
STRESS TARGET HR: 156 BPM

## 2021-03-29 ENCOUNTER — TELEPHONE (OUTPATIENT)
Dept: CARDIOLOGY | Facility: CLINIC | Age: 36
End: 2021-03-29

## 2021-03-29 ENCOUNTER — LAB (OUTPATIENT)
Dept: LAB | Facility: HOSPITAL | Age: 36
End: 2021-03-29

## 2021-03-29 DIAGNOSIS — R00.2 PALPITATIONS: ICD-10-CM

## 2021-03-29 DIAGNOSIS — R07.9 CHEST PAIN, UNSPECIFIED TYPE: ICD-10-CM

## 2021-03-29 DIAGNOSIS — R06.02 SHORTNESS OF BREATH: ICD-10-CM

## 2021-03-29 DIAGNOSIS — R94.31 ABNORMAL EKG: ICD-10-CM

## 2021-03-29 DIAGNOSIS — I10 ESSENTIAL HYPERTENSION: ICD-10-CM

## 2021-03-29 LAB — D DIMER PPP FEU-MCNC: <0.27 MCGFEU/ML (ref 0–0.5)

## 2021-03-29 PROCEDURE — 85379 FIBRIN DEGRADATION QUANT: CPT

## 2021-03-29 PROCEDURE — 36415 COLL VENOUS BLD VENIPUNCTURE: CPT

## 2021-03-29 NOTE — TELEPHONE ENCOUNTER
Left voicemail for patient to call back office for test results and new appointment info. VESNA, DAVID.

## 2021-03-29 NOTE — TELEPHONE ENCOUNTER
Per Luz Marina, she was calling regd pt's stress test.         Called and informed pt of results and appt tomorrow at 11am, she verbalized understanding.

## 2021-03-30 ENCOUNTER — OFFICE VISIT (OUTPATIENT)
Dept: CARDIOLOGY | Facility: CLINIC | Age: 36
End: 2021-03-30

## 2021-03-30 ENCOUNTER — LAB (OUTPATIENT)
Dept: LAB | Facility: HOSPITAL | Age: 36
End: 2021-03-30

## 2021-03-30 VITALS
SYSTOLIC BLOOD PRESSURE: 112 MMHG | WEIGHT: 150 LBS | BODY MASS INDEX: 24.99 KG/M2 | DIASTOLIC BLOOD PRESSURE: 66 MMHG | OXYGEN SATURATION: 99 % | HEART RATE: 66 BPM | HEIGHT: 65 IN

## 2021-03-30 DIAGNOSIS — R06.02 SHORTNESS OF BREATH: Primary | ICD-10-CM

## 2021-03-30 DIAGNOSIS — R94.39 ABNORMAL STRESS TEST: ICD-10-CM

## 2021-03-30 DIAGNOSIS — R06.02 SHORTNESS OF BREATH: ICD-10-CM

## 2021-03-30 DIAGNOSIS — R07.9 CHEST PAIN, UNSPECIFIED TYPE: ICD-10-CM

## 2021-03-30 LAB
ALBUMIN SERPL-MCNC: 4.66 G/DL (ref 3.5–5.2)
ALBUMIN/GLOB SERPL: 1.7 G/DL
ALP SERPL-CCNC: 75 U/L (ref 39–117)
ALT SERPL W P-5'-P-CCNC: 17 U/L (ref 1–33)
ANION GAP SERPL CALCULATED.3IONS-SCNC: 12.6 MMOL/L (ref 5–15)
AST SERPL-CCNC: 17 U/L (ref 1–32)
BASOPHILS # BLD AUTO: 0.06 10*3/MM3 (ref 0–0.2)
BASOPHILS NFR BLD AUTO: 0.8 % (ref 0–1.5)
BILIRUB SERPL-MCNC: 0.2 MG/DL (ref 0–1.2)
BUN SERPL-MCNC: 10 MG/DL (ref 6–20)
BUN/CREAT SERPL: 14.9 (ref 7–25)
CALCIUM SPEC-SCNC: 9.6 MG/DL (ref 8.6–10.5)
CHLORIDE SERPL-SCNC: 104 MMOL/L (ref 98–107)
CO2 SERPL-SCNC: 23.4 MMOL/L (ref 22–29)
CREAT SERPL-MCNC: 0.67 MG/DL (ref 0.57–1)
DEPRECATED RDW RBC AUTO: 47.7 FL (ref 37–54)
EOSINOPHIL # BLD AUTO: 0.3 10*3/MM3 (ref 0–0.4)
EOSINOPHIL NFR BLD AUTO: 4.1 % (ref 0.3–6.2)
ERYTHROCYTE [DISTWIDTH] IN BLOOD BY AUTOMATED COUNT: 13.9 % (ref 12.3–15.4)
GFR SERPL CREATININE-BSD FRML MDRD: 100 ML/MIN/1.73
GLOBULIN UR ELPH-MCNC: 2.7 GM/DL
GLUCOSE SERPL-MCNC: 97 MG/DL (ref 65–99)
HCT VFR BLD AUTO: 43.9 % (ref 34–46.6)
HGB BLD-MCNC: 13.8 G/DL (ref 12–15.9)
IMM GRANULOCYTES # BLD AUTO: 0.02 10*3/MM3 (ref 0–0.05)
IMM GRANULOCYTES NFR BLD AUTO: 0.3 % (ref 0–0.5)
LYMPHOCYTES # BLD AUTO: 3.06 10*3/MM3 (ref 0.7–3.1)
LYMPHOCYTES NFR BLD AUTO: 41.5 % (ref 19.6–45.3)
MCH RBC QN AUTO: 29 PG (ref 26.6–33)
MCHC RBC AUTO-ENTMCNC: 31.4 G/DL (ref 31.5–35.7)
MCV RBC AUTO: 92.2 FL (ref 79–97)
MONOCYTES # BLD AUTO: 0.57 10*3/MM3 (ref 0.1–0.9)
MONOCYTES NFR BLD AUTO: 7.7 % (ref 5–12)
NEUTROPHILS NFR BLD AUTO: 3.37 10*3/MM3 (ref 1.7–7)
NEUTROPHILS NFR BLD AUTO: 45.6 % (ref 42.7–76)
NRBC BLD AUTO-RTO: 0 /100 WBC (ref 0–0.2)
PLATELET # BLD AUTO: 212 10*3/MM3 (ref 140–450)
PMV BLD AUTO: 10.4 FL (ref 6–12)
POTASSIUM SERPL-SCNC: 4 MMOL/L (ref 3.5–5.2)
PROT SERPL-MCNC: 7.4 G/DL (ref 6–8.5)
RBC # BLD AUTO: 4.76 10*6/MM3 (ref 3.77–5.28)
SODIUM SERPL-SCNC: 140 MMOL/L (ref 136–145)
WBC # BLD AUTO: 7.38 10*3/MM3 (ref 3.4–10.8)

## 2021-03-30 PROCEDURE — 80053 COMPREHEN METABOLIC PANEL: CPT

## 2021-03-30 PROCEDURE — 99214 OFFICE O/P EST MOD 30 MIN: CPT | Performed by: PHYSICIAN ASSISTANT

## 2021-03-30 PROCEDURE — 85025 COMPLETE CBC W/AUTO DIFF WBC: CPT

## 2021-03-30 RX ORDER — CLONAZEPAM 0.5 MG/1
TABLET ORAL DAILY
COMMUNITY
Start: 2021-03-04

## 2021-03-30 NOTE — PROGRESS NOTES
Problem list     Subjective   Rafat Barrera is a 35 y.o. female     Chief Complaint   Patient presents with   • Follow-up     presents for stress, echo and monitor f/u   • Chest Pain   • Palpitations   • Shortness of Breath   • Dizziness   • Loss of Consciousness   Problem list  1. Chest pain  1.1 Stress test negative 2015 but EKG changes worrisome but no diagnostic for ischemia  1.2 No CAD noted by recent cardiac CT, April 2015.  1.3 stress test March 2021 suggest a large anterior ischemic defect with preserved LV function  2. Palpitations  2.1 event monitor 2021 suggest premature beats but no other significant arrhythmia  3. Dyspnea  4. Tobacco Habituation, smokes 1/2 a pack a day  5. GERD  6.  Nonobstructive lower extremity arterial 2017  7.  Negative tilt table test March 2018    HPI    Patient is a 35-year-old female presents back to the office to be evaluated.  Patient had stress test, echocardiogram and event monitor ordered.  Findings of stress test as above and event monitor showing premature beats but no severe arrhythmia.  Good LV function was noted.    She continues to feel discomfort.  She feels a heaviness and pressure in her chest.  She has been concerned.  She has been on antianginal therapy with continues to feel this discomfort.  She will also notice that it will radiate to her left arm.  She has dyspnea that is chronic but stable.  Mild levels of exertional dyspnea when doing activity but nothing that has been severe.  She does describe at times having orthopnea although no evidence of failure by echocardiogram.    She does continued to palpitate on occasion.  She has been on atenolol.  She does not describe any dizziness presyncope or syncope.  Otherwise she is stable      Current Outpatient Medications on File Prior to Visit   Medication Sig Dispense Refill   • aspirin 81 MG tablet Take 1 tablet by mouth daily.     • atenolol (TENORMIN) 25 MG tablet Take 1 tablet by mouth Daily. 60 tablet 5     • atorvastatin (LIPITOR) 20 MG tablet TAKE ONE TABLET BY MOUTH EVERY EVENING 30 tablet 5   • buprenorphine-naloxone (SUBOXONE) 8-2 MG per SL tablet Place 1 tablet under the tongue Daily.     • clonazePAM (KlonoPIN) 0.5 MG tablet Daily.     • dicyclomine (BENTYL) 10 MG capsule prn  2   • gabapentin (NEURONTIN) 400 MG capsule 3 (Three) Times a Day.     • nitroglycerin (Nitrostat) 0.4 MG SL tablet Place 1 tablet under the tongue Every 5 (Five) Minutes As Needed for Chest Pain. 15 tablet 6   • omeprazole (priLOSEC) 20 MG capsule Take 1 capsule by mouth Daily. 30 capsule 1   • Loratadine (CLARITIN) 10 MG capsule Take  by mouth daily.     • [DISCONTINUED] Cetirizine HCl (ZYRTEC ALLERGY PO) Take  by mouth.     • [DISCONTINUED] cloNIDine (CATAPRES) 0.1 MG tablet Take 0.1 mg by mouth As Needed.     • [DISCONTINUED] escitalopram (LEXAPRO) 20 MG tablet Take 20 mg by mouth Daily.       No current facility-administered medications on file prior to visit.       Latex and Sulfa antibiotics    Past Medical History:   Diagnosis Date   • Abdominal pain    • Chest pain    • Fatigue    • Gastroenteritis    • GERD (gastroesophageal reflux disease)    • Hyperlipidemia    • Hypertension    • Palpitations    • Shortness of breath        Social History     Socioeconomic History   • Marital status: Single     Spouse name: Not on file   • Number of children: Not on file   • Years of education: Not on file   • Highest education level: Not on file   Tobacco Use   • Smoking status: Current Every Day Smoker     Packs/day: 1.00     Types: Cigarettes   • Smokeless tobacco: Never Used   • Tobacco comment: 1/2 ppd   Substance and Sexual Activity   • Alcohol use: No   • Drug use: No     Comment: History of drug abuse       Family History   Problem Relation Age of Onset   • Diabetes Mother    • Hyperlipidemia Mother    • Heart attack Father    • Hypertension Father    • Heart attack Maternal Aunt    • Heart attack Maternal Uncle    • Heart failure  "Other         congestive   • Stroke Other    • Sudden death Other        Review of Systems   Constitutional: Positive for chills, diaphoresis and fatigue. Negative for fever.   Eyes: Negative.    Respiratory: Positive for cough and shortness of breath (with daily activity and times at rest). Negative for apnea, chest tightness and wheezing.    Cardiovascular: Positive for chest pain and palpitations. Negative for leg swelling.   Gastrointestinal: Positive for nausea. Negative for abdominal pain, blood in stool, constipation, diarrhea and vomiting.   Endocrine: Negative.    Genitourinary: Negative.  Negative for hematuria.   Musculoskeletal: Positive for arthralgias, back pain, myalgias and neck pain.   Skin: Negative.    Allergic/Immunologic: Positive for environmental allergies. Negative for food allergies.   Neurological: Positive for dizziness (with position change and quick movment ), syncope (last episode 2 weeks ago with change in direction while doing dishes ) and headaches. Negative for weakness, light-headedness and numbness.   Hematological: Bruises/bleeds easily.   Psychiatric/Behavioral: Positive for agitation and sleep disturbance (insomnia). The patient is nervous/anxious.        Objective   Vitals:    03/30/21 1115 03/30/21 1121 03/30/21 1122 03/30/21 1123   BP: 106/45 110/52 106/67 112/66   BP Location: Left arm Left arm Left arm Left arm   Patient Position: Sitting Lying Sitting Standing   Pulse: 75 54 57 66   SpO2: 99%      Weight: 68 kg (150 lb)      Height: 165.1 cm (65\")         /66 (BP Location: Left arm, Patient Position: Standing)   Pulse 66   Ht 165.1 cm (65\")   Wt 68 kg (150 lb)   SpO2 99%   BMI 24.96 kg/m²     Lab Results (most recent)     None          Physical Exam  Vitals and nursing note reviewed.   Constitutional:       General: She is not in acute distress.     Appearance: Normal appearance. She is well-developed.   HENT:      Head: Normocephalic and atraumatic.   Eyes:    "   General: No scleral icterus.        Right eye: No discharge.         Left eye: No discharge.      Conjunctiva/sclera: Conjunctivae normal.   Neck:      Vascular: No carotid bruit.   Cardiovascular:      Rate and Rhythm: Normal rate and regular rhythm.      Heart sounds: Normal heart sounds. No murmur heard.   No friction rub. No gallop.    Pulmonary:      Effort: Pulmonary effort is normal. No respiratory distress.      Breath sounds: Normal breath sounds. No wheezing or rales.   Chest:      Chest wall: No tenderness.   Musculoskeletal:      Right lower leg: No edema.      Left lower leg: No edema.   Skin:     General: Skin is warm and dry.      Coloration: Skin is not pale.      Findings: No erythema or rash.   Neurological:      Mental Status: She is alert and oriented to person, place, and time.      Cranial Nerves: No cranial nerve deficit.   Psychiatric:         Behavior: Behavior normal.         Procedure   Procedures       Assessment/Plan     Problems Addressed this Visit        Cardiac and Vasculature    Chest pain    Relevant Orders    [facility]    CBC & Differential    Comprehensive Metabolic Panel    COVID-19,[facility] ROUTINE, NP/OP SWAB IN TRANSPORT MEDIA OR ESWAB 72 HR TAT - Swab, Nasopharynx       Pulmonary and Pneumonias    Shortness of breath - Primary    Relevant Orders    [facility]    CBC & Differential    Comprehensive Metabolic Panel    COVID-19,[facility] ROUTINE, NP/OP SWAB IN TRANSPORT MEDIA OR ESWAB 72 HR TAT - Swab, Nasopharynx      Other Visit Diagnoses     Abnormal stress test        Relevant Orders    [facility]    CBC & Differential    Comprehensive Metabolic Panel    COVID-19,[facility] ROUTINE, NP/OP SWAB IN TRANSPORT MEDIA OR ESWAB 72 HR TAT - Swab, Nasopharynx      Diagnoses       Codes Comments    Shortness of breath    -  Primary ICD-10-CM: R06.02  ICD-9-CM: 786.05     Chest pain, unspecified type     ICD-10-CM: R07.9  ICD-9-CM: 786.50     Abnormal stress  test     ICD-10-CM: R94.39  ICD-9-CM: 794.39           Recommendation  1.  Patient with complaints of chest pain and dyspnea.  Patient's discomfort is concerning that she has had left arm pain.  She has been on antianginal therapy continues to have symptoms and has a large anterior ischemic defect.  She would like further evaluation.  Catheterization will be scheduled    2.  Otherwise, we will see her back for follow-up after catheterization.  Palpitations are manageable at this point.  We can discuss further upon follow-up.  Follow-up with primary as scheduled           Rafat Barrera  reports that she has been smoking cigarettes. She has been smoking about 1.00 pack per day. She has never used smokeless tobacco.. I have educated her on the risk of diseases from using tobacco products such as cancer, COPD and heart disease.     I advised her to quit and she is not willing to quit.    I spent 3  minutes counseling the patient.          Patient's Body mass index is 24.96 kg/m². BMI is within normal parameters. No follow-up required..       Electronically signed by:

## 2021-04-01 RX ORDER — OMEPRAZOLE 20 MG/1
CAPSULE, DELAYED RELEASE ORAL
Qty: 30 CAPSULE | Refills: 2 | Status: SHIPPED | OUTPATIENT
Start: 2021-04-01

## 2021-04-22 ENCOUNTER — OFFICE VISIT (OUTPATIENT)
Dept: CARDIOLOGY | Facility: CLINIC | Age: 36
End: 2021-04-22

## 2021-04-22 VITALS
BODY MASS INDEX: 24.83 KG/M2 | OXYGEN SATURATION: 100 % | HEART RATE: 71 BPM | HEIGHT: 65 IN | WEIGHT: 149 LBS | SYSTOLIC BLOOD PRESSURE: 101 MMHG | DIASTOLIC BLOOD PRESSURE: 34 MMHG

## 2021-04-22 DIAGNOSIS — R00.2 PALPITATIONS: ICD-10-CM

## 2021-04-22 DIAGNOSIS — I95.89 CHRONIC HYPOTENSION: Primary | ICD-10-CM

## 2021-04-22 DIAGNOSIS — R06.02 SHORTNESS OF BREATH: ICD-10-CM

## 2021-04-22 PROCEDURE — 99213 OFFICE O/P EST LOW 20 MIN: CPT | Performed by: PHYSICIAN ASSISTANT

## 2021-04-22 RX ORDER — ATORVASTATIN CALCIUM 40 MG/1
40 TABLET, FILM COATED ORAL DAILY
COMMUNITY

## 2021-04-22 RX ORDER — OMEPRAZOLE 40 MG/1
40 CAPSULE, DELAYED RELEASE ORAL DAILY
COMMUNITY

## 2021-04-22 RX ORDER — FLUDROCORTISONE ACETATE 0.1 MG/1
0.1 TABLET ORAL DAILY
Qty: 30 TABLET | Refills: 1 | Status: SHIPPED | OUTPATIENT
Start: 2021-04-22 | End: 2021-05-20

## 2021-04-22 RX ORDER — PANTOPRAZOLE SODIUM 40 MG/1
40 TABLET, DELAYED RELEASE ORAL DAILY
COMMUNITY

## 2021-04-22 NOTE — PROGRESS NOTES
Problem list     Subjective   Rafat Barrera is a 35 y.o. female     Chief Complaint   Patient presents with   • Follow-up   Problem list  1. Chest pain  1.1 Stress test negative 2015 but EKG changes worrisome but no diagnostic for ischemia  1.2 No CAD noted by recent cardiac CT, April 2015.  1.3 stress test March 2021 suggest a large anterior ischemic defect with preserved LV function  1.4 cardiac catheterization April 2021 suggest normal coronaries and good LV function  2. Palpitations  2.1 event monitor 2021 suggest premature beats but no other significant arrhythmia  3. Dyspnea  4. Tobacco Habituation, smokes 1/2 a pack a day  5. GERD  6.  Nonobstructive lower extremity arterial 2017  7.  Negative tilt table test March 2018    HPI    Patient is a 35-year-old female that presents back to the office for follow-up.  She has done well.  She underwent catheterization during the hospitalization because she had chest discomfort.  Catheterization was normal.  She underwent GI evaluation apparently had a degree of gastritis.    She is doing better.  She has felt improvement.  She does not describe any chest pain.  In fact symptoms have improved.  She has mild dyspnea but no progressive shortness of breath.  No failure symptoms    She was taken off atenolol as apparently there was a degree of hypotension and bradycardia.  She has done well off this medication and for now appears to be stable      Current Outpatient Medications on File Prior to Visit   Medication Sig Dispense Refill   • aspirin 81 MG tablet Take 1 tablet by mouth daily.     • atorvastatin (LIPITOR) 40 MG tablet Take 40 mg by mouth Daily.     • buprenorphine-naloxone (SUBOXONE) 8-2 MG per SL tablet Place 1 tablet under the tongue Daily.     • clonazePAM (KlonoPIN) 0.5 MG tablet Daily.     • dicyclomine (BENTYL) 10 MG capsule prn  2   • gabapentin (NEURONTIN) 400 MG capsule 3 (Three) Times a Day.     • Loratadine (CLARITIN) 10 MG capsule Take  by mouth  daily.     • nitroglycerin (Nitrostat) 0.4 MG SL tablet Place 1 tablet under the tongue Every 5 (Five) Minutes As Needed for Chest Pain. 15 tablet 6   • omeprazole (priLOSEC) 40 MG capsule Take 40 mg by mouth Daily.     • pantoprazole (PROTONIX) 40 MG EC tablet Take 40 mg by mouth Daily.     • atenolol (TENORMIN) 25 MG tablet Take 1 tablet by mouth Daily. 60 tablet 5   • atorvastatin (LIPITOR) 20 MG tablet TAKE ONE TABLET BY MOUTH EVERY EVENING 30 tablet 5   • omeprazole (priLOSEC) 20 MG capsule TAKE ONE CAPSULE BY MOUTH EVERY DAY 30 capsule 2     No current facility-administered medications on file prior to visit.       Latex and Sulfa antibiotics    Past Medical History:   Diagnosis Date   • Abdominal pain    • Chest pain    • Fatigue    • Gastroenteritis    • GERD (gastroesophageal reflux disease)    • Hyperlipidemia    • Hypertension    • Palpitations    • Shortness of breath        Social History     Socioeconomic History   • Marital status: Single     Spouse name: Not on file   • Number of children: Not on file   • Years of education: Not on file   • Highest education level: Not on file   Tobacco Use   • Smoking status: Current Every Day Smoker     Packs/day: 1.00     Types: Cigarettes   • Smokeless tobacco: Never Used   • Tobacco comment: 1/2 ppd   Substance and Sexual Activity   • Alcohol use: No   • Drug use: No     Comment: History of drug abuse       Family History   Problem Relation Age of Onset   • Diabetes Mother    • Hyperlipidemia Mother    • Heart attack Father    • Hypertension Father    • Heart attack Maternal Aunt    • Heart attack Maternal Uncle    • Heart failure Other         congestive   • Stroke Other    • Sudden death Other        Review of Systems   Constitutional: Positive for fatigue. Negative for chills and fever.   HENT: Negative for congestion, rhinorrhea and sore throat.    Eyes: Negative.    Respiratory: Positive for shortness of breath. Negative for chest tightness.   "  Cardiovascular: Negative.  Negative for chest pain, palpitations and leg swelling.   Gastrointestinal: Negative.    Endocrine: Positive for heat intolerance. Negative for cold intolerance.   Genitourinary: Negative.    Musculoskeletal: Positive for back pain, neck pain and neck stiffness.   Skin: Negative.  Negative for rash and wound.   Neurological: Positive for dizziness, light-headedness, numbness and headaches. Negative for weakness.   Hematological: Bruises/bleeds easily.   Psychiatric/Behavioral: Positive for sleep disturbance (denies SoA at HS).       Objective   Vitals:    04/22/21 1550   BP: (!) 101/34   BP Location: Left arm   Patient Position: Sitting   Pulse: 71   SpO2: 100%   Weight: 67.6 kg (149 lb)   Height: 165.1 cm (65\")      BP (!) 101/34 (BP Location: Left arm, Patient Position: Sitting)   Pulse 71   Ht 165.1 cm (65\")   Wt 67.6 kg (149 lb)   SpO2 100%   BMI 24.79 kg/m²     Lab Results (most recent)     None          Physical Exam  Vitals and nursing note reviewed.   Constitutional:       General: She is not in acute distress.     Appearance: Normal appearance. She is well-developed.   HENT:      Head: Normocephalic and atraumatic.   Eyes:      General: No scleral icterus.        Right eye: No discharge.         Left eye: No discharge.      Conjunctiva/sclera: Conjunctivae normal.   Neck:      Vascular: No carotid bruit.   Cardiovascular:      Rate and Rhythm: Normal rate and regular rhythm.      Heart sounds: Normal heart sounds. No murmur heard.   No friction rub. No gallop.    Pulmonary:      Effort: Pulmonary effort is normal. No respiratory distress.      Breath sounds: Normal breath sounds. No wheezing or rales.   Chest:      Chest wall: No tenderness.   Musculoskeletal:      Right lower leg: No edema.      Left lower leg: No edema.   Skin:     General: Skin is warm and dry.      Coloration: Skin is not pale.      Findings: No erythema or rash.   Neurological:      Mental Status: She " is alert and oriented to person, place, and time.      Cranial Nerves: No cranial nerve deficit.   Psychiatric:         Behavior: Behavior normal.         Procedure   Procedures       Assessment/Plan     Problems Addressed this Visit        Cardiac and Vasculature    Palpitations    Chronic hypotension - Primary       Pulmonary and Pneumonias    Shortness of breath      Diagnoses       Codes Comments    Chronic hypotension    -  Primary ICD-10-CM: I95.89  ICD-9-CM: 458.1     Shortness of breath     ICD-10-CM: R06.02  ICD-9-CM: 786.05     Palpitations     ICD-10-CM: R00.2  ICD-9-CM: 785.1                  Recommendation  1.  Patient with chronic hypotension even after stopping atenolol.  She has had a degree of dizziness and issues when standing.  Would like to place her on low-dose fludrocortisone to help in regards to symptoms.    2.  She is continued on atenolol.  Palpitations have been minimal and she has no significant arrhythmia    3.  Otherwise dyspnea is mild but likely noncardiac.  We will see her back for follow-up in 2 to 3 months.  Follow-up with primary as scheduled  Rafat Barrera  reports that she has been smoking cigarettes. She has been smoking about 1.00 pack per day. She has never used smokeless tobacco.. I have educated her on the risk of diseases from using tobacco products such as cancer, COPD and heart disease.     I advised her to quit and she is not willing to quit.    I spent 3  minutes counseling the patient.          Patient's Body mass index is 24.79 kg/m². BMI is within normal parameters. No follow-up required..       Electronically signed by:

## 2021-04-26 ENCOUNTER — CLINICAL SUPPORT (OUTPATIENT)
Dept: CARDIOLOGY | Facility: CLINIC | Age: 36
End: 2021-04-26

## 2021-04-26 VITALS
HEIGHT: 65 IN | OXYGEN SATURATION: 97 % | DIASTOLIC BLOOD PRESSURE: 44 MMHG | WEIGHT: 151.8 LBS | HEART RATE: 80 BPM | BODY MASS INDEX: 25.29 KG/M2 | SYSTOLIC BLOOD PRESSURE: 108 MMHG

## 2021-04-26 DIAGNOSIS — I95.89 CHRONIC HYPOTENSION: Primary | ICD-10-CM

## 2021-04-26 DIAGNOSIS — R42 DIZZINESS: ICD-10-CM

## 2021-04-26 PROCEDURE — 99211 OFF/OP EST MAY X REQ PHY/QHP: CPT | Performed by: PHYSICIAN ASSISTANT

## 2021-04-26 RX ORDER — MIDODRINE HYDROCHLORIDE 5 MG/1
5 TABLET ORAL 2 TIMES DAILY
Qty: 60 TABLET | Refills: 11 | Status: SHIPPED | OUTPATIENT
Start: 2021-04-26

## 2021-04-26 NOTE — PROGRESS NOTES
Rafat Barrera  1985 4/26/2021   ?   Chief Complaint   Patient presents with   • Hypotension     Here for nurse visit b/p and sx check   • Dizziness      ?   HPI:   ?   ? Patient was seen on 4- per BRITTNI Fletcher for f/u s/p hospitalization and heart cath. On 4-. She was taken off of Atenolol due to a degree of hypotension and bradycardia. At 4- visit patient was started on Florinef 0.1 mg po daily. Patient confirms has been taking med., tolerating well, but has noticed no change/improvement in dizziness. States she still has random episodes of dizziness. bp today here at the office 108/44. She does not have a home b/p machine, but discussed in detail with her need to get one and monitor b/p and h/r. Verbalized she understood. Will discuss above with BRITTNI Fletcher and proceed accordingly. PH,LPN  ?     Current Outpatient Medications:   •  aspirin 81 MG tablet, Take 1 tablet by mouth daily., Disp: , Rfl:   •  atorvastatin (LIPITOR) 20 MG tablet, TAKE ONE TABLET BY MOUTH EVERY EVENING, Disp: 30 tablet, Rfl: 5  •  atorvastatin (LIPITOR) 40 MG tablet, Take 40 mg by mouth Daily., Disp: , Rfl:   •  buprenorphine-naloxone (SUBOXONE) 8-2 MG per SL tablet, Place 1 tablet under the tongue Daily., Disp: , Rfl:   •  clonazePAM (KlonoPIN) 0.5 MG tablet, Daily., Disp: , Rfl:   •  dicyclomine (BENTYL) 10 MG capsule, prn, Disp: , Rfl: 2  •  fludrocortisone 0.1 MG tablet, Take 1 tablet by mouth Daily., Disp: 30 tablet, Rfl: 1  •  gabapentin (NEURONTIN) 400 MG capsule, 3 (Three) Times a Day., Disp: , Rfl:   •  Loratadine (CLARITIN) 10 MG capsule, Take  by mouth daily., Disp: , Rfl:   •  omeprazole (priLOSEC) 20 MG capsule, TAKE ONE CAPSULE BY MOUTH EVERY DAY, Disp: 30 capsule, Rfl: 2  •  omeprazole (priLOSEC) 40 MG capsule, Take 40 mg by mouth Daily., Disp: , Rfl:   •  pantoprazole (PROTONIX) 40 MG EC tablet, Take 40 mg by mouth Daily., Disp: , Rfl:   •  atenolol (TENORMIN) 25 MG tablet, Take  1 tablet by mouth Daily., Disp: 60 tablet, Rfl: 5  •  nitroglycerin (Nitrostat) 0.4 MG SL tablet, Place 1 tablet under the tongue Every 5 (Five) Minutes As Needed for Chest Pain., Disp: 15 tablet, Rfl: 6   ?   ?   Latex and Sulfa antibiotics       Procedures     ?   Assessment/Plan    ?   ? 1. Hypotension       2. Dizziness    V/O per Bhupinder Barfield, PAC to continue Florinef and send in Midodrine 5 mg po bid, monitor b/p and h/r at home, schedule 2 week nurse visit f/u. Discussed above with Mrs. Barrera and aware script sent in today. Verbalized she understood. PH,LPN  ?

## 2021-05-11 ENCOUNTER — CLINICAL SUPPORT (OUTPATIENT)
Dept: CARDIOLOGY | Facility: CLINIC | Age: 36
End: 2021-05-11

## 2021-05-11 VITALS
DIASTOLIC BLOOD PRESSURE: 68 MMHG | HEART RATE: 53 BPM | WEIGHT: 155.6 LBS | OXYGEN SATURATION: 99 % | HEIGHT: 65 IN | SYSTOLIC BLOOD PRESSURE: 107 MMHG | BODY MASS INDEX: 25.92 KG/M2

## 2021-05-11 PROCEDURE — 99211 OFF/OP EST MAY X REQ PHY/QHP: CPT | Performed by: PHYSICIAN ASSISTANT

## 2021-05-11 RX ORDER — DROXIDOPA 200 MG/1
200 CAPSULE ORAL 3 TIMES DAILY
Qty: 90 CAPSULE | Refills: 11 | Status: SHIPPED | OUTPATIENT
Start: 2021-05-11 | End: 2021-05-18 | Stop reason: ALTCHOICE

## 2021-05-11 RX ORDER — ERGOCALCIFEROL 1.25 MG/1
50000 CAPSULE ORAL WEEKLY
COMMUNITY

## 2021-05-11 NOTE — PROGRESS NOTES
"Rafat Barrera  1985 5/11/2021   ?   Chief Complaint   Patient presents with   • Nurse visit     BP check       ?   HPI:   ? Pt was seen for a nurse visit on 4/26/21, appt note states:  \"V/O per Bhupinder Barfield, PAC to continue Florinef and send in Midodrine 5 mg po bid, monitor b/p and h/r at home, schedule 2 week nurse visit f/u. Discussed above with Mrs. Barrera and aware script sent in today. Verbalized she understood. PH,LPN\"  ?   BP at last nurse visit:   /44 (BP Location: Left arm, Patient Position: Sitting)  ?     Current Outpatient Medications:   •  aspirin 81 MG tablet, Take 1 tablet by mouth daily., Disp: , Rfl:   •  atenolol (TENORMIN) 25 MG tablet, Take 1 tablet by mouth Daily., Disp: 60 tablet, Rfl: 5  •  atorvastatin (LIPITOR) 20 MG tablet, TAKE ONE TABLET BY MOUTH EVERY EVENING, Disp: 30 tablet, Rfl: 5  •  atorvastatin (LIPITOR) 40 MG tablet, Take 40 mg by mouth Daily., Disp: , Rfl:   •  buprenorphine-naloxone (SUBOXONE) 8-2 MG per SL tablet, Place 1 tablet under the tongue Daily., Disp: , Rfl:   •  clonazePAM (KlonoPIN) 0.5 MG tablet, Daily., Disp: , Rfl:   •  dicyclomine (BENTYL) 10 MG capsule, prn, Disp: , Rfl: 2  •  fludrocortisone 0.1 MG tablet, Take 1 tablet by mouth Daily., Disp: 30 tablet, Rfl: 1  •  gabapentin (NEURONTIN) 400 MG capsule, 3 (Three) Times a Day., Disp: , Rfl:   •  Loratadine (CLARITIN) 10 MG capsule, Take  by mouth daily., Disp: , Rfl:   •  midodrine (PROAMATINE) 5 MG tablet, Take 1 tablet by mouth 2 (Two) Times a Day., Disp: 60 tablet, Rfl: 11  •  nitroglycerin (Nitrostat) 0.4 MG SL tablet, Place 1 tablet under the tongue Every 5 (Five) Minutes As Needed for Chest Pain., Disp: 15 tablet, Rfl: 6  •  omeprazole (priLOSEC) 20 MG capsule, TAKE ONE CAPSULE BY MOUTH EVERY DAY, Disp: 30 capsule, Rfl: 2  •  omeprazole (priLOSEC) 40 MG capsule, Take 40 mg by mouth Daily., Disp: , Rfl:   •  pantoprazole (PROTONIX) 40 MG EC tablet, Take 40 mg by mouth Daily., Disp: , " "Rfl:    ?   ?   Latex and Sulfa antibiotics       Procedures     ?   Assessment/Plan    ?   1. ?Hypotension    Pt in office for BP check since starting Midodrine 5mg BID and continuing Florinef on 4/26/21.  Pt states she's taking Midodrine 1/2 tab BID(stated that's what her rx bottle says to do) and that it makes her legs hurt \"really bad.\" Stated she has continued her Florinef.  Has not been keeping BP log at home, stated BP has been \"decent.\" I asked for more specific readings, she stated usually 107-112/58.Pt denies any new or worsening cardiac sx.   Only rx change since last being here it Vit D, stated she's very deficient.   BP today: 107/68         Per verbal order from REBECCA Fletcher:  Can try Northera 200mg TID or can stay as is. Have pt call if she has further issues.     Informed pt of the above instructions. She asked to try the Northera (I added Midodrine as an intolerance) and she requested that it be sent to [facility]. She is aware to keep follow up appt in August. She verbalized understanding and is aware of plan.   - TAHIR Kirk   ?       "

## 2021-05-17 DIAGNOSIS — R42 DIZZINESS: ICD-10-CM

## 2021-05-17 DIAGNOSIS — I95.89 CHRONIC HYPOTENSION: ICD-10-CM

## 2021-05-17 NOTE — TELEPHONE ENCOUNTER
Pt LVM stating new rx isn't covered by [facility] and needs alt.       Per chart review, Northera was sent in on 5/11/21.

## 2021-05-18 RX ORDER — MIDODRINE HYDROCHLORIDE 5 MG/1
5 TABLET ORAL 2 TIMES DAILY
Qty: 60 TABLET | Refills: 11 | Status: CANCELLED | OUTPATIENT
Start: 2021-05-18

## 2021-05-19 RX ORDER — FLUDROCORTISONE ACETATE 0.1 MG/1
0.1 TABLET ORAL DAILY
Qty: 30 TABLET | Refills: 11 | Status: SHIPPED | OUTPATIENT
Start: 2021-05-19

## 2021-05-19 RX ORDER — MIDODRINE HYDROCHLORIDE 5 MG/1
2.5 TABLET ORAL 2 TIMES DAILY
Qty: 30 TABLET | Refills: 11 | Status: SHIPPED | OUTPATIENT
Start: 2021-05-19

## 2021-05-19 NOTE — TELEPHONE ENCOUNTER
Called pt and informed her of the message from Bhupinder, she verbalized understanding and requested rx be sent to [facility].

## 2021-05-20 RX ORDER — FLUDROCORTISONE ACETATE 0.1 MG/1
0.1 TABLET ORAL DAILY
Qty: 30 TABLET | Refills: 1 | Status: SHIPPED | OUTPATIENT
Start: 2021-05-20

## 2023-09-12 ENCOUNTER — TELEPHONE (OUTPATIENT)
Dept: CARDIOLOGY | Facility: CLINIC | Age: 38
End: 2023-09-12

## 2023-09-12 DIAGNOSIS — R00.2 PALPITATIONS: ICD-10-CM

## 2023-09-12 DIAGNOSIS — I10 ESSENTIAL HYPERTENSION: ICD-10-CM

## 2023-09-12 RX ORDER — ATENOLOL 25 MG/1
25 TABLET ORAL DAILY
Qty: 60 TABLET | Refills: 5 | OUTPATIENT
Start: 2023-09-12

## 2023-09-12 NOTE — TELEPHONE ENCOUNTER
Caller: Rafat Barrera    Relationship: Self    Best call back number: [phone]    Requested Prescriptions:   Requested Prescriptions     Pending Prescriptions Disp Refills    atenolol (TENORMIN) 25 MG tablet 60 tablet 5     Sig: Take 1 tablet by mouth Daily.        Pharm[facility] where request should be sent: [facility] [address] [phone] [facility] [phone]      Last office visit with prescribing clinician: Visit date not found   Last telemedicine visit with prescribing clinician: Visit date not found   Next office visit with prescribing clinician: Visit date not found     Additional details provided by patient: PATIENT ADVISING WHEN [facility] THE MEDICATION WAS NOT GIVEN TO HER; SHE IS REQUESTING A REFILL. SHE HAS O OF THE MEDICATION. PLEASE ADVISE PATIENT IF SO.    Does the patient have less than a 3 day supply:  [x] Yes  [] No    Would you like a call back once the refill request has been completed: [] Yes [] No    If the office needs to give you a call back, can they leave a voicemail: [] Yes [] No    Jluio Germain Rep   09/12/23 09:04 EDT

## 2023-09-12 NOTE — TELEPHONE ENCOUNTER
Caller: Rafat Barrera    Relationship to patient: Self    Best call back number: [phone]    Chief complaint:     Type of visit: FOLLOW UP    Requested date: ASAP     If rescheduling, when is the original appointment: NONE     Additional notes:[facility] HAS NO SCHEDULING TIME FRAME PATIENT HASN'T BEEN SEEN SINCE  4.2021. PLEASE CALL AT THE ABOVE NUMBER.

## 2023-09-14 ENCOUNTER — OFFICE VISIT (OUTPATIENT)
Dept: CARDIOLOGY | Facility: CLINIC | Age: 38
End: 2023-09-14
Payer: COMMERCIAL

## 2023-09-14 VITALS
BODY MASS INDEX: 24.32 KG/M2 | SYSTOLIC BLOOD PRESSURE: 100 MMHG | HEIGHT: 65 IN | WEIGHT: 146 LBS | HEART RATE: 53 BPM | DIASTOLIC BLOOD PRESSURE: 100 MMHG

## 2023-09-14 DIAGNOSIS — R06.00 DYSPNEA, UNSPECIFIED TYPE: ICD-10-CM

## 2023-09-14 DIAGNOSIS — R07.9 CHEST PAIN, UNSPECIFIED TYPE: Primary | ICD-10-CM

## 2023-09-14 DIAGNOSIS — R00.2 PALPITATIONS: ICD-10-CM

## 2023-09-14 PROCEDURE — 3074F SYST BP LT 130 MM HG: CPT | Performed by: PHYSICIAN ASSISTANT

## 2023-09-14 PROCEDURE — 93000 ELECTROCARDIOGRAM COMPLETE: CPT | Performed by: PHYSICIAN ASSISTANT

## 2023-09-14 PROCEDURE — 99214 OFFICE O/P EST MOD 30 MIN: CPT | Performed by: PHYSICIAN ASSISTANT

## 2023-09-14 PROCEDURE — 3080F DIAST BP >= 90 MM HG: CPT | Performed by: PHYSICIAN ASSISTANT

## 2023-09-14 RX ORDER — SERTRALINE HYDROCHLORIDE 100 MG/1
TABLET, FILM COATED ORAL
COMMUNITY
Start: 2023-09-08

## 2023-09-14 RX ORDER — FLECAINIDE ACETATE 50 MG/1
50 TABLET ORAL 2 TIMES DAILY
Qty: 60 TABLET | Refills: 11 | Status: SHIPPED | OUTPATIENT
Start: 2023-09-14

## 2023-09-14 NOTE — PROGRESS NOTES
Problem list     Subjective   Rafat Barrera is a 37 y.o. female     Chief Complaint   Patient presents with    Follow-up     Chest pain     Problem list  1. Chest pain  1.1 Stress test negative 2015 but EKG changes worrisome but no diagnostic for ischemia  1.2 No CAD noted by recent cardiac CT, April 2015.  1.3 stress test March 2021 suggest a large anterior ischemic defect with preserved LV function  1.4 cardiac catheterization April 2021 suggest normal coronaries and good LV function  2. Palpitations  2.1 event monitor 2021 suggest premature beats but no other significant arrhythmia  3. Dyspnea  4. Tobacco Habituation, smokes 1/2 a pack a day  5. GERD  6.  Nonobstructive lower extremity arterial 2017  7.  Negative tilt table test March 2018     HPI    Patient is a 37-year-old female who presents to the office to be evaluated.    Patient has done well.  She has been seen in the past and underwent extensive cardiac evaluation including catheterization which was normal.  She had an event monitor in 2021 demonstrating PACs or premature beats but has been managed medically.  In the past, she was on fludrocortisone to help with her blood pressure and beta-blocker therapy to help with palpitations.  She did not tolerate medications well and is stopped at all at this time.  She is on medication for anxiety.    Recently, she has had issues with palpitations.  She experiences this on a daily basis.  She will feel a fluttering type and rapid sensation.  It will be experienced with chest pain.  She also gets chest pain outside of palpitations but that appears to be more of a chronic issue.  She can be mildly dyspneic but does not describe any progressive shortness of breath.  No PND or orthopnea.    She has had some dizziness.  She does not describe any syncopal episodes.  She otherwise appears stable.      Current Outpatient Medications on File Prior to Visit   Medication Sig Dispense Refill    sertraline (ZOLOFT) 100 MG  tablet       [DISCONTINUED] aspirin 81 MG tablet Take 1 tablet by mouth Daily.      [DISCONTINUED] atenolol (TENORMIN) 25 MG tablet Take 1 tablet by mouth Daily. 60 tablet 5    [DISCONTINUED] atorvastatin (LIPITOR) 20 MG tablet TAKE ONE TABLET BY MOUTH EVERY EVENING 30 tablet 5    [DISCONTINUED] atorvastatin (LIPITOR) 40 MG tablet Take 40 mg by mouth Daily.      [DISCONTINUED] buprenorphine-naloxone (SUBOXONE) 8-2 MG per SL tablet Place 1 tablet under the tongue Daily.      [DISCONTINUED] clonazePAM (KlonoPIN) 0.5 MG tablet Daily.      [DISCONTINUED] dicyclomine (BENTYL) 10 MG capsule prn  2    [DISCONTINUED] fludrocortisone 0.1 MG tablet Take 1 tablet by mouth Daily. 30 tablet 11    [DISCONTINUED] fludrocortisone 0.1 MG tablet TAKE 1 TABLET BY MOUTH DAILY. 30 tablet 1    [DISCONTINUED] gabapentin (NEURONTIN) 400 MG capsule 3 (Three) Times a Day.      [DISCONTINUED] Loratadine (CLARITIN) 10 MG capsule Take  by mouth daily.      [DISCONTINUED] midodrine (PROAMATINE) 5 MG tablet Take 1 tablet by mouth 2 (Two) Times a Day. (Patient taking differently: Take 2.5 mg by mouth 2 (Two) Times a Day.) 60 tablet 11    [DISCONTINUED] midodrine (PROAMATINE) 5 MG tablet Take 0.5 tablets by mouth 2 (Two) Times a Day. 30 tablet 11    [DISCONTINUED] nitroglycerin (Nitrostat) 0.4 MG SL tablet Place 1 tablet under the tongue Every 5 (Five) Minutes As Needed for Chest Pain. 15 tablet 6    [DISCONTINUED] omeprazole (priLOSEC) 20 MG capsule TAKE ONE CAPSULE BY MOUTH EVERY DAY 30 capsule 2    [DISCONTINUED] omeprazole (priLOSEC) 40 MG capsule Take 40 mg by mouth Daily.      [DISCONTINUED] pantoprazole (PROTONIX) 40 MG EC tablet Take 40 mg by mouth Daily.      [DISCONTINUED] vitamin D (ERGOCALCIFEROL) 1.25 MG (64664 UT) capsule capsule Take 50,000 Units by mouth 1 (One) Time Per Week.       No current facility-administered medications on file prior to visit.       Midodrine, Latex, and Sulfa antibiotics    Past Medical History:   Diagnosis  "Date    Abdominal pain     Chest pain     Fatigue     Gastroenteritis     GERD (gastroesophageal reflux disease)     Hyperlipidemia     Hypertension     Palpitations     Shortness of breath        Social History     Socioeconomic History    Marital status: Single   Tobacco Use    Smoking status: Former     Packs/day: 1.00     Types: Cigarettes    Smokeless tobacco: Never    Tobacco comments:     PATIENT VAPES   Vaping Use    Vaping Use: Every day    Substances: Nicotine   Substance and Sexual Activity    Alcohol use: No    Drug use: No     Comment: History of drug abuse       Family History   Problem Relation Age of Onset    Diabetes Mother     Hyperlipidemia Mother     Heart attack Father     Hypertension Father     Heart attack Maternal Aunt     Heart attack Maternal Uncle     Heart failure Other         congestive    Stroke Other     Sudden death Other        Review of Systems   Constitutional: Negative.  Negative for activity change, appetite change, chills, fatigue and fever.   Eyes:  Positive for visual disturbance.   Respiratory:  Positive for shortness of breath. Negative for apnea, cough, chest tightness and wheezing.    Cardiovascular:  Positive for chest pain and palpitations. Negative for leg swelling.   Gastrointestinal: Negative.  Negative for blood in stool.   Endocrine: Negative.    Genitourinary: Negative.  Negative for hematuria.   Skin: Negative.  Negative for color change, rash and wound.   Allergic/Immunologic: Negative.    Neurological:  Positive for dizziness and headaches. Negative for syncope, weakness, light-headedness and numbness.   Hematological: Negative.  Does not bruise/bleed easily.   Psychiatric/Behavioral: Negative.  Negative for sleep disturbance.      Objective   Vitals:    09/14/23 1412   BP: 100/100   BP Location: Left arm   Patient Position: Sitting   Cuff Size: Adult   Pulse: 53   Weight: 66.2 kg (146 lb)   Height: 165.1 cm (65\")      /100 (BP Location: Left arm, " "Patient Position: Sitting, Cuff Size: Adult)   Pulse 53   Ht 165.1 cm (65\")   Wt 66.2 kg (146 lb)   BMI 24.30 kg/m²     Lab Results (most recent)       None            Physical Exam  Vitals and nursing note reviewed.   Constitutional:       General: She is not in acute distress.     Appearance: Normal appearance. She is well-developed.   HENT:      Head: Normocephalic and atraumatic.   Eyes:      General: No scleral icterus.        Right eye: No discharge.         Left eye: No discharge.      Conjunctiva/sclera: Conjunctivae normal.   Neck:      Vascular: No carotid bruit.   Cardiovascular:      Rate and Rhythm: Normal rate and regular rhythm.      Heart sounds: Normal heart sounds. No murmur heard.    No friction rub. No gallop.   Pulmonary:      Effort: Pulmonary effort is normal. No respiratory distress.      Breath sounds: Normal breath sounds. No wheezing or rales.   Chest:      Chest wall: No tenderness.   Musculoskeletal:      Right lower leg: No edema.      Left lower leg: No edema.   Skin:     General: Skin is warm and dry.      Coloration: Skin is not pale.      Findings: No erythema or rash.   Neurological:      Mental Status: She is alert and oriented to person, place, and time.      Cranial Nerves: No cranial nerve deficit.   Psychiatric:         Behavior: Behavior normal.       Procedure     ECG 12 Lead    Date/Time: 9/14/2023 2:18 PM  Performed by: Bhupinder Barfield PA  Authorized by: Bhupinder Barfield PA   Comparison: compared with previous ECG from 9/9/2021  Comments: EKG demonstrates sinus rhythm at 74 bpm with no acute ST changes           Assessment & Plan     Problems Addressed this Visit          Cardiac and Vasculature    Chest pain - Primary    Palpitations       Symptoms and Signs    Dyspnea     Diagnoses         Codes Comments    Chest pain, unspecified type    -  Primary ICD-10-CM: R07.9  ICD-9-CM: 786.50     Palpitations     ICD-10-CM: R00.2  ICD-9-CM: 785.1     Dyspnea, unspecified " type     ICD-10-CM: R06.00  ICD-9-CM: 786.09           Recommendation  1.  Patient is a 37-year-old female who presents back to the office for evaluation.  In the past, she had extensive cardiac evaluation including cardiac catheterization which was normal.  She has had arrhythmia issues in regards to premature beats which was diagnosed by an event monitor in 2021.  In the past, she was placed on medication to help with orthostatic issues and hypotension and then placed on low-dose beta-blocker therapy to help with palpitations.  Unfortunately, this did not work.  I do not feel we need to go down that road again.    2.  We discussed options to consider event monitor which I would recommend.  We ultimately could try flecainide or some type of rhythm control therapy that would not affect her heart rate or blood pressure.  She wants to try medicine.  I will try flecainide to see if we can suppress premature beats to see if she has improvement.  We may ultimately need to stop it if she does not have improvement.  She will start this on Saturday and return Monday for nurse visit and EKG monitor QTc.    3.  Otherwise, chest pain could be related to palpitations.  I do not feel it is from obstructive coronary disease.  Patient with dyspnea at times but otherwise is doing well.  I will make no change and see her back for follow-up on Monday and recommend further.  Follow-up with primary as scheduled.             Rafat Barrera  reports that she has quit smoking. Her smoking use included cigarettes. She smoked an average of 1 pack per day. She has never used smokeless tobacco.. I have educated her on the risk of diseases from using tobacco products such as cancer, COPD, and heart disease.     I advised her to quit and she is not willing to quit.    I spent 3  minutes counseling the patient.  (PATIENT VAPES-NICOTINE)        Patient did not bring med list or medicine bottles to appointment, med list has been reviewed and  updated based on patient's knowledge of their meds.      Electronically signed by:

## 2023-09-14 NOTE — LETTER
September 14, 2023       No Recipients    Patient: Rafat Barrera   YOB: 1985   Date of Visit: 9/14/2023       Dear ALICE Pittman    Rafat Barrera was in my office today. Below is a copy of my note.    If you have questions, please do not hesitate to call me. I look forward to following Rafat along with you.         Sincerely,        REBECCA Guzman        CC:   No Recipients    Problem list     Subjective  Rafat Barrera is a 37 y.o. female     Chief Complaint   Patient presents with   • Follow-up     Chest pain     Problem list  1. Chest pain  1.1 Stress test negative 2015 but EKG changes worrisome but no diagnostic for ischemia  1.2 No CAD noted by recent cardiac CT, April 2015.  1.3 stress test March 2021 suggest a large anterior ischemic defect with preserved LV function  1.4 cardiac catheterization April 2021 suggest normal coronaries and good LV function  2. Palpitations  2.1 event monitor 2021 suggest premature beats but no other significant arrhythmia  3. Dyspnea  4. Tobacco Habituation, smokes 1/2 a pack a day  5. GERD  6.  Nonobstructive lower extremity arterial 2017  7.  Negative tilt table test March 2018     HPI    Patient is a 37-year-old female who presents to the office to be evaluated.    Patient has done well.  She has been seen in the past and underwent extensive cardiac evaluation including catheterization which was normal.  She had an event monitor in 2021 demonstrating PACs or premature beats but has been managed medically.  In the past, she was on fludrocortisone to help with her blood pressure and beta-blocker therapy to help with palpitations.  She did not tolerate medications well and is stopped at all at this time.  She is on medication for anxiety.    Recently, she has had issues with palpitations.  She experiences this on a daily basis.  She will feel a fluttering type and rapid sensation.  It will be experienced with chest pain.  She also gets  chest pain outside of palpitations but that appears to be more of a chronic issue.  She can be mildly dyspneic but does not describe any progressive shortness of breath.  No PND or orthopnea.    She has had some dizziness.  She does not describe any syncopal episodes.  She otherwise appears stable.      Current Outpatient Medications on File Prior to Visit   Medication Sig Dispense Refill   • sertraline (ZOLOFT) 100 MG tablet      • [DISCONTINUED] aspirin 81 MG tablet Take 1 tablet by mouth Daily.     • [DISCONTINUED] atenolol (TENORMIN) 25 MG tablet Take 1 tablet by mouth Daily. 60 tablet 5   • [DISCONTINUED] atorvastatin (LIPITOR) 20 MG tablet TAKE ONE TABLET BY MOUTH EVERY EVENING 30 tablet 5   • [DISCONTINUED] atorvastatin (LIPITOR) 40 MG tablet Take 40 mg by mouth Daily.     • [DISCONTINUED] buprenorphine-naloxone (SUBOXONE) 8-2 MG per SL tablet Place 1 tablet under the tongue Daily.     • [DISCONTINUED] clonazePAM (KlonoPIN) 0.5 MG tablet Daily.     • [DISCONTINUED] dicyclomine (BENTYL) 10 MG capsule prn  2   • [DISCONTINUED] fludrocortisone 0.1 MG tablet Take 1 tablet by mouth Daily. 30 tablet 11   • [DISCONTINUED] fludrocortisone 0.1 MG tablet TAKE 1 TABLET BY MOUTH DAILY. 30 tablet 1   • [DISCONTINUED] gabapentin (NEURONTIN) 400 MG capsule 3 (Three) Times a Day.     • [DISCONTINUED] Loratadine (CLARITIN) 10 MG capsule Take  by mouth daily.     • [DISCONTINUED] midodrine (PROAMATINE) 5 MG tablet Take 1 tablet by mouth 2 (Two) Times a Day. (Patient taking differently: Take 2.5 mg by mouth 2 (Two) Times a Day.) 60 tablet 11   • [DISCONTINUED] midodrine (PROAMATINE) 5 MG tablet Take 0.5 tablets by mouth 2 (Two) Times a Day. 30 tablet 11   • [DISCONTINUED] nitroglycerin (Nitrostat) 0.4 MG SL tablet Place 1 tablet under the tongue Every 5 (Five) Minutes As Needed for Chest Pain. 15 tablet 6   • [DISCONTINUED] omeprazole (priLOSEC) 20 MG capsule TAKE ONE CAPSULE BY MOUTH EVERY DAY 30 capsule 2   • [DISCONTINUED]  omeprazole (priLOSEC) 40 MG capsule Take 40 mg by mouth Daily.     • [DISCONTINUED] pantoprazole (PROTONIX) 40 MG EC tablet Take 40 mg by mouth Daily.     • [DISCONTINUED] vitamin D (ERGOCALCIFEROL) 1.25 MG (52153 UT) capsule capsule Take 50,000 Units by mouth 1 (One) Time Per Week.       No current facility-administered medications on file prior to visit.       Midodrine, Latex, and Sulfa antibiotics    Past Medical History:   Diagnosis Date   • Abdominal pain    • Chest pain    • Fatigue    • Gastroenteritis    • GERD (gastroesophageal reflux disease)    • Hyperlipidemia    • Hypertension    • Palpitations    • Shortness of breath        Social History     Socioeconomic History   • Marital status: Single   Tobacco Use   • Smoking status: Former     Packs/day: 1.00     Types: Cigarettes   • Smokeless tobacco: Never   • Tobacco comments:     PATIENT VAPES   Vaping Use   • Vaping Use: Every day   • Substances: Nicotine   Substance and Sexual Activity   • Alcohol use: No   • Drug use: No     Comment: History of drug abuse       Family History   Problem Relation Age of Onset   • Diabetes Mother    • Hyperlipidemia Mother    • Heart attack Father    • Hypertension Father    • Heart attack Maternal Aunt    • Heart attack Maternal Uncle    • Heart failure Other         congestive   • Stroke Other    • Sudden death Other        Review of Systems   Constitutional: Negative.  Negative for activity change, appetite change, chills, fatigue and fever.   Eyes:  Positive for visual disturbance.   Respiratory:  Positive for shortness of breath. Negative for apnea, cough, chest tightness and wheezing.    Cardiovascular:  Positive for chest pain and palpitations. Negative for leg swelling.   Gastrointestinal: Negative.  Negative for blood in stool.   Endocrine: Negative.    Genitourinary: Negative.  Negative for hematuria.   Skin: Negative.  Negative for color change, rash and wound.   Allergic/Immunologic: Negative.   "  Neurological:  Positive for dizziness and headaches. Negative for syncope, weakness, light-headedness and numbness.   Hematological: Negative.  Does not bruise/bleed easily.   Psychiatric/Behavioral: Negative.  Negative for sleep disturbance.      Objective  Vitals:    09/14/23 1412   BP: 100/100   BP Location: Left arm   Patient Position: Sitting   Cuff Size: Adult   Pulse: 53   Weight: 66.2 kg (146 lb)   Height: 165.1 cm (65\")      /100 (BP Location: Left arm, Patient Position: Sitting, Cuff Size: Adult)   Pulse 53   Ht 165.1 cm (65\")   Wt 66.2 kg (146 lb)   BMI 24.30 kg/m²     Lab Results (most recent)       None            Physical Exam  Vitals and nursing note reviewed.   Constitutional:       General: She is not in acute distress.     Appearance: Normal appearance. She is well-developed.   HENT:      Head: Normocephalic and atraumatic.   Eyes:      General: No scleral icterus.        Right eye: No discharge.         Left eye: No discharge.      Conjunctiva/sclera: Conjunctivae normal.   Neck:      Vascular: No carotid bruit.   Cardiovascular:      Rate and Rhythm: Normal rate and regular rhythm.      Heart sounds: Normal heart sounds. No murmur heard.    No friction rub. No gallop.   Pulmonary:      Effort: Pulmonary effort is normal. No respiratory distress.      Breath sounds: Normal breath sounds. No wheezing or rales.   Chest:      Chest wall: No tenderness.   Musculoskeletal:      Right lower leg: No edema.      Left lower leg: No edema.   Skin:     General: Skin is warm and dry.      Coloration: Skin is not pale.      Findings: No erythema or rash.   Neurological:      Mental Status: She is alert and oriented to person, place, and time.      Cranial Nerves: No cranial nerve deficit.   Psychiatric:         Behavior: Behavior normal.       Procedure    ECG 12 Lead    Date/Time: 9/14/2023 2:18 PM  Performed by: Bhupinder Barfield PA  Authorized by: Bhupinder Barfield PA   Comparison: compared " with previous ECG from 9/9/2021  Comments: EKG demonstrates sinus rhythm at 74 bpm with no acute ST changes           Assessment & Plan    Problems Addressed this Visit          Cardiac and Vasculature    Chest pain - Primary    Palpitations       Symptoms and Signs    Dyspnea     Diagnoses         Codes Comments    Chest pain, unspecified type    -  Primary ICD-10-CM: R07.9  ICD-9-CM: 786.50     Palpitations     ICD-10-CM: R00.2  ICD-9-CM: 785.1     Dyspnea, unspecified type     ICD-10-CM: R06.00  ICD-9-CM: 786.09           Recommendation  1.  Patient is a 37-year-old female who presents back to the office for evaluation.  In the past, she had extensive cardiac evaluation including cardiac catheterization which was normal.  She has had arrhythmia issues in regards to premature beats which was diagnosed by an event monitor in 2021.  In the past, she was placed on medication to help with orthostatic issues and hypotension and then placed on low-dose beta-blocker therapy to help with palpitations.  Unfortunately, this did not work.  I do not feel we need to go down that road again.    2.  We discussed options to consider event monitor which I would recommend.  We ultimately could try flecainide or some type of rhythm control therapy that would not affect her heart rate or blood pressure.  She wants to try medicine.  I will try flecainide to see if we can suppress premature beats to see if she has improvement.  We may ultimately need to stop it if she does not have improvement.  She will start this on Saturday and return Monday for nurse visit and EKG monitor QTc.    3.  Otherwise, chest pain could be related to palpitations.  I do not feel it is from obstructive coronary disease.  Patient with dyspnea at times but otherwise is doing well.  I will make no change and see her back for follow-up on Monday and recommend further.  Follow-up with primary as scheduled.             Rafat Barrera  reports that she has quit  smoking. Her smoking use included cigarettes. She smoked an average of 1 pack per day. She has never used smokeless tobacco.. I have educated her on the risk of diseases from using tobacco products such as cancer, COPD, and heart disease.     I advised her to quit and she is not willing to quit.    I spent 3  minutes counseling the patient.  (PATIENT VAPES-NICOTINE)        Patient did not bring med list or medicine bottles to appointment, med list has been reviewed and updated based on patient's knowledge of their meds.      Electronically signed by:

## 2023-09-14 NOTE — LETTER
September 14, 2023       No Recipients    Patient: Rafat Barrera   YOB: 1985   Date of Visit: 9/14/2023       Dear ALICE Pittman    Rafat Barrera was in my office today. Below is a copy of my note.    If you have questions, please do not hesitate to call me. I look forward to following Rafat along with you.         Sincerely,        REBECCA Guzman        CC:   No Recipients    Problem list     Subjective  Rafat Barrera is a 37 y.o. female     Chief Complaint   Patient presents with   • Follow-up     Chest pain     Problem list  1. Chest pain  1.1 Stress test negative 2015 but EKG changes worrisome but no diagnostic for ischemia  1.2 No CAD noted by recent cardiac CT, April 2015.  1.3 stress test March 2021 suggest a large anterior ischemic defect with preserved LV function  1.4 cardiac catheterization April 2021 suggest normal coronaries and good LV function  2. Palpitations  2.1 event monitor 2021 suggest premature beats but no other significant arrhythmia  3. Dyspnea  4. Tobacco Habituation, smokes 1/2 a pack a day  5. GERD  6.  Nonobstructive lower extremity arterial 2017  7.  Negative tilt table test March 2018     HPI    Patient is a 37-year-old female who presents to the office to be evaluated.    Patient has done well.  She has been seen in the past and underwent extensive cardiac evaluation including catheterization which was normal.  She had an event monitor in 2021 demonstrating PACs or premature beats but has been managed medically.  In the past, she was on fludrocortisone to help with her blood pressure and beta-blocker therapy to help with palpitations.  She did not tolerate medications well and is stopped at all at this time.  She is on medication for anxiety.    Recently, she has had issues with palpitations.  She experiences this on a daily basis.  She will feel a fluttering type and rapid sensation.  It will be experienced with chest pain.  She also gets  chest pain outside of palpitations but that appears to be more of a chronic issue.  She can be mildly dyspneic but does not describe any progressive shortness of breath.  No PND or orthopnea.    She has had some dizziness.  She does not describe any syncopal episodes.  She otherwise appears stable.      Current Outpatient Medications on File Prior to Visit   Medication Sig Dispense Refill   • sertraline (ZOLOFT) 100 MG tablet      • [DISCONTINUED] aspirin 81 MG tablet Take 1 tablet by mouth Daily.     • [DISCONTINUED] atenolol (TENORMIN) 25 MG tablet Take 1 tablet by mouth Daily. 60 tablet 5   • [DISCONTINUED] atorvastatin (LIPITOR) 20 MG tablet TAKE ONE TABLET BY MOUTH EVERY EVENING 30 tablet 5   • [DISCONTINUED] atorvastatin (LIPITOR) 40 MG tablet Take 40 mg by mouth Daily.     • [DISCONTINUED] buprenorphine-naloxone (SUBOXONE) 8-2 MG per SL tablet Place 1 tablet under the tongue Daily.     • [DISCONTINUED] clonazePAM (KlonoPIN) 0.5 MG tablet Daily.     • [DISCONTINUED] dicyclomine (BENTYL) 10 MG capsule prn  2   • [DISCONTINUED] fludrocortisone 0.1 MG tablet Take 1 tablet by mouth Daily. 30 tablet 11   • [DISCONTINUED] fludrocortisone 0.1 MG tablet TAKE 1 TABLET BY MOUTH DAILY. 30 tablet 1   • [DISCONTINUED] gabapentin (NEURONTIN) 400 MG capsule 3 (Three) Times a Day.     • [DISCONTINUED] Loratadine (CLARITIN) 10 MG capsule Take  by mouth daily.     • [DISCONTINUED] midodrine (PROAMATINE) 5 MG tablet Take 1 tablet by mouth 2 (Two) Times a Day. (Patient taking differently: Take 2.5 mg by mouth 2 (Two) Times a Day.) 60 tablet 11   • [DISCONTINUED] midodrine (PROAMATINE) 5 MG tablet Take 0.5 tablets by mouth 2 (Two) Times a Day. 30 tablet 11   • [DISCONTINUED] nitroglycerin (Nitrostat) 0.4 MG SL tablet Place 1 tablet under the tongue Every 5 (Five) Minutes As Needed for Chest Pain. 15 tablet 6   • [DISCONTINUED] omeprazole (priLOSEC) 20 MG capsule TAKE ONE CAPSULE BY MOUTH EVERY DAY 30 capsule 2   • [DISCONTINUED]  omeprazole (priLOSEC) 40 MG capsule Take 40 mg by mouth Daily.     • [DISCONTINUED] pantoprazole (PROTONIX) 40 MG EC tablet Take 40 mg by mouth Daily.     • [DISCONTINUED] vitamin D (ERGOCALCIFEROL) 1.25 MG (27140 UT) capsule capsule Take 50,000 Units by mouth 1 (One) Time Per Week.       No current facility-administered medications on file prior to visit.       Midodrine, Latex, and Sulfa antibiotics    Past Medical History:   Diagnosis Date   • Abdominal pain    • Chest pain    • Fatigue    • Gastroenteritis    • GERD (gastroesophageal reflux disease)    • Hyperlipidemia    • Hypertension    • Palpitations    • Shortness of breath        Social History     Socioeconomic History   • Marital status: Single   Tobacco Use   • Smoking status: Former     Packs/day: 1.00     Types: Cigarettes   • Smokeless tobacco: Never   • Tobacco comments:     PATIENT VAPES   Vaping Use   • Vaping Use: Every day   • Substances: Nicotine   Substance and Sexual Activity   • Alcohol use: No   • Drug use: No     Comment: History of drug abuse       Family History   Problem Relation Age of Onset   • Diabetes Mother    • Hyperlipidemia Mother    • Heart attack Father    • Hypertension Father    • Heart attack Maternal Aunt    • Heart attack Maternal Uncle    • Heart failure Other         congestive   • Stroke Other    • Sudden death Other        Review of Systems   Constitutional: Negative.  Negative for activity change, appetite change, chills, fatigue and fever.   Eyes:  Positive for visual disturbance.   Respiratory:  Positive for shortness of breath. Negative for apnea, cough, chest tightness and wheezing.    Cardiovascular:  Positive for chest pain and palpitations. Negative for leg swelling.   Gastrointestinal: Negative.  Negative for blood in stool.   Endocrine: Negative.    Genitourinary: Negative.  Negative for hematuria.   Skin: Negative.  Negative for color change, rash and wound.   Allergic/Immunologic: Negative.   "  Neurological:  Positive for dizziness and headaches. Negative for syncope, weakness, light-headedness and numbness.   Hematological: Negative.  Does not bruise/bleed easily.   Psychiatric/Behavioral: Negative.  Negative for sleep disturbance.      Objective  Vitals:    09/14/23 1412   BP: 100/100   BP Location: Left arm   Patient Position: Sitting   Cuff Size: Adult   Pulse: 53   Weight: 66.2 kg (146 lb)   Height: 165.1 cm (65\")      /100 (BP Location: Left arm, Patient Position: Sitting, Cuff Size: Adult)   Pulse 53   Ht 165.1 cm (65\")   Wt 66.2 kg (146 lb)   BMI 24.30 kg/m²     Lab Results (most recent)       None            Physical Exam  Vitals and nursing note reviewed.   Constitutional:       General: She is not in acute distress.     Appearance: Normal appearance. She is well-developed.   HENT:      Head: Normocephalic and atraumatic.   Eyes:      General: No scleral icterus.        Right eye: No discharge.         Left eye: No discharge.      Conjunctiva/sclera: Conjunctivae normal.   Neck:      Vascular: No carotid bruit.   Cardiovascular:      Rate and Rhythm: Normal rate and regular rhythm.      Heart sounds: Normal heart sounds. No murmur heard.    No friction rub. No gallop.   Pulmonary:      Effort: Pulmonary effort is normal. No respiratory distress.      Breath sounds: Normal breath sounds. No wheezing or rales.   Chest:      Chest wall: No tenderness.   Musculoskeletal:      Right lower leg: No edema.      Left lower leg: No edema.   Skin:     General: Skin is warm and dry.      Coloration: Skin is not pale.      Findings: No erythema or rash.   Neurological:      Mental Status: She is alert and oriented to person, place, and time.      Cranial Nerves: No cranial nerve deficit.   Psychiatric:         Behavior: Behavior normal.       Procedure    ECG 12 Lead    Date/Time: 9/14/2023 2:18 PM  Performed by: Bhupinder Barfield PA  Authorized by: Bhupinder Barfield PA   Comparison: compared " with previous ECG from 9/9/2021  Comments: EKG demonstrates sinus rhythm at 74 bpm with no acute ST changes           Assessment & Plan    Problems Addressed this Visit          Cardiac and Vasculature    Chest pain - Primary    Palpitations       Symptoms and Signs    Dyspnea     Diagnoses         Codes Comments    Chest pain, unspecified type    -  Primary ICD-10-CM: R07.9  ICD-9-CM: 786.50     Palpitations     ICD-10-CM: R00.2  ICD-9-CM: 785.1     Dyspnea, unspecified type     ICD-10-CM: R06.00  ICD-9-CM: 786.09           Recommendation  1.  Patient is a 37-year-old female who presents back to the office for evaluation.  In the past, she had extensive cardiac evaluation including cardiac catheterization which was normal.  She has had arrhythmia issues in regards to premature beats which was diagnosed by an event monitor in 2021.  In the past, she was placed on medication to help with orthostatic issues and hypotension and then placed on low-dose beta-blocker therapy to help with palpitations.  Unfortunately, this did not work.  I do not feel we need to go down that road again.    2.  We discussed options to consider event monitor which I would recommend.  We ultimately could try flecainide or some type of rhythm control therapy that would not affect her heart rate or blood pressure.  She wants to try medicine.  I will try flecainide to see if we can suppress premature beats to see if she has improvement.  We may ultimately need to stop it if she does not have improvement.  She will start this on Saturday and return Monday for nurse visit and EKG monitor QTc.    3.  Otherwise, chest pain could be related to palpitations.  I do not feel it is from obstructive coronary disease.  Patient with dyspnea at times but otherwise is doing well.  I will make no change and see her back for follow-up on Monday and recommend further.  Follow-up with primary as scheduled.             Rafat Barrera  reports that she has quit  smoking. Her smoking use included cigarettes. She smoked an average of 1 pack per day. She has never used smokeless tobacco.. I have educated her on the risk of diseases from using tobacco products such as cancer, COPD, and heart disease.     I advised her to quit and she is not willing to quit.    I spent 3  minutes counseling the patient.  (PATIENT VAPES-NICOTINE)        Patient did not bring med list or medicine bottles to appointment, med list has been reviewed and updated based on patient's knowledge of their meds.      Electronically signed by:

## 2025-03-17 NOTE — TELEPHONE ENCOUNTER
Addended by: JIGAR DE LA ROSA on: 3/17/2025 04:37 PM     Modules accepted: Orders     Per Bhupinder: Can try Florinef .1 and Midodrine 2.5 BID, since Midodrine 5mg caused leg cramps.     (updated rx list)         First attempt to reach pt. Left a voicemail for pt to return my call at [phone].